# Patient Record
Sex: MALE | Race: WHITE | NOT HISPANIC OR LATINO | Employment: FULL TIME | URBAN - NONMETROPOLITAN AREA
[De-identification: names, ages, dates, MRNs, and addresses within clinical notes are randomized per-mention and may not be internally consistent; named-entity substitution may affect disease eponyms.]

---

## 2019-10-11 ENCOUNTER — APPOINTMENT (OUTPATIENT)
Dept: NUCLEAR MEDICINE | Facility: HOSPITAL | Age: 54
End: 2019-10-11

## 2019-10-11 ENCOUNTER — APPOINTMENT (OUTPATIENT)
Dept: ULTRASOUND IMAGING | Facility: HOSPITAL | Age: 54
End: 2019-10-11

## 2019-10-11 ENCOUNTER — APPOINTMENT (OUTPATIENT)
Dept: CARDIOLOGY | Facility: HOSPITAL | Age: 54
End: 2019-10-11

## 2019-10-11 ENCOUNTER — HOSPITAL ENCOUNTER (INPATIENT)
Facility: HOSPITAL | Age: 54
LOS: 1 days | Discharge: HOME | End: 2019-10-11
Attending: INTERNAL MEDICINE | Admitting: INTERNAL MEDICINE

## 2019-10-11 VITALS
RESPIRATION RATE: 14 BRPM | HEIGHT: 69 IN | SYSTOLIC BLOOD PRESSURE: 134 MMHG | WEIGHT: 185.1 LBS | OXYGEN SATURATION: 99 % | TEMPERATURE: 96.8 F | DIASTOLIC BLOOD PRESSURE: 78 MMHG | HEART RATE: 58 BPM | BODY MASS INDEX: 27.42 KG/M2

## 2019-10-11 DIAGNOSIS — E11.40 TYPE 2 DIABETES MELLITUS WITH DIABETIC NEUROPATHY, WITHOUT LONG-TERM CURRENT USE OF INSULIN (HCC): Primary | ICD-10-CM

## 2019-10-11 PROBLEM — I20.0 UNSTABLE ANGINA (HCC): Status: RESOLVED | Noted: 2019-10-11 | Resolved: 2019-10-11

## 2019-10-11 PROBLEM — I20.0 UNSTABLE ANGINA: Status: ACTIVE | Noted: 2019-10-11

## 2019-10-11 LAB
ALBUMIN SERPL-MCNC: 4.27 G/DL (ref 3.5–5.2)
ALBUMIN/GLOB SERPL: 1.5 G/DL
ALP SERPL-CCNC: 69 U/L (ref 39–117)
ALT SERPL W P-5'-P-CCNC: 9 U/L (ref 1–41)
ANION GAP SERPL CALCULATED.3IONS-SCNC: 14.2 MMOL/L (ref 5–15)
APTT PPP: 30.3 SECONDS (ref 23.8–36.1)
AST SERPL-CCNC: 13 U/L (ref 1–40)
BASOPHILS # BLD AUTO: 0.01 10*3/MM3 (ref 0–0.2)
BASOPHILS NFR BLD AUTO: 0.1 % (ref 0–1.5)
BH CV ECHO MEAS - % IVS THICK: 6.9 %
BH CV ECHO MEAS - % LVPW THICK: 50.3 %
BH CV ECHO MEAS - ACS: 1.5 CM
BH CV ECHO MEAS - AO MAX PG: 8.8 MMHG
BH CV ECHO MEAS - AO MEAN PG: 5.6 MMHG
BH CV ECHO MEAS - AO ROOT AREA (BSA CORRECTED): 1.8
BH CV ECHO MEAS - AO ROOT AREA: 10.3 CM^2
BH CV ECHO MEAS - AO ROOT DIAM: 3.6 CM
BH CV ECHO MEAS - AO V2 MAX: 148 CM/SEC
BH CV ECHO MEAS - AO V2 MEAN: 111.9 CM/SEC
BH CV ECHO MEAS - AO V2 VTI: 34.7 CM
BH CV ECHO MEAS - BSA(HAYCOCK): 2 M^2
BH CV ECHO MEAS - BSA: 2 M^2
BH CV ECHO MEAS - BZI_BMI: 27.3 KILOGRAMS/M^2
BH CV ECHO MEAS - BZI_METRIC_HEIGHT: 175.3 CM
BH CV ECHO MEAS - BZI_METRIC_WEIGHT: 83.9 KG
BH CV ECHO MEAS - EDV(CUBED): 86.2 ML
BH CV ECHO MEAS - EDV(MOD-SP4): 62 ML
BH CV ECHO MEAS - EDV(TEICH): 88.5 ML
BH CV ECHO MEAS - EF(CUBED): 86.1 %
BH CV ECHO MEAS - EF(MOD-SP4): 54.8 %
BH CV ECHO MEAS - EF(TEICH): 79.8 %
BH CV ECHO MEAS - ESV(CUBED): 12 ML
BH CV ECHO MEAS - ESV(MOD-SP4): 28 ML
BH CV ECHO MEAS - ESV(TEICH): 17.9 ML
BH CV ECHO MEAS - FS: 48.2 %
BH CV ECHO MEAS - IVS/LVPW: 0.83
BH CV ECHO MEAS - IVSD: 1.2 CM
BH CV ECHO MEAS - IVSS: 1.3 CM
BH CV ECHO MEAS - LA DIMENSION: 3.1 CM
BH CV ECHO MEAS - LA/AO: 0.86
BH CV ECHO MEAS - LV DIASTOLIC VOL/BSA (35-75): 31 ML/M^2
BH CV ECHO MEAS - LV MASS(C)D: 221.3 GRAMS
BH CV ECHO MEAS - LV MASS(C)DI: 110.8 GRAMS/M^2
BH CV ECHO MEAS - LV MASS(C)S: 147.5 GRAMS
BH CV ECHO MEAS - LV MASS(C)SI: 73.8 GRAMS/M^2
BH CV ECHO MEAS - LV SYSTOLIC VOL/BSA (12-30): 14 ML/M^2
BH CV ECHO MEAS - LVIDD: 4.4 CM
BH CV ECHO MEAS - LVIDS: 2.3 CM
BH CV ECHO MEAS - LVLD AP4: 7.5 CM
BH CV ECHO MEAS - LVLS AP4: 6.9 CM
BH CV ECHO MEAS - LVOT AREA (M): 4.2 CM^2
BH CV ECHO MEAS - LVOT AREA: 4.1 CM^2
BH CV ECHO MEAS - LVOT DIAM: 2.3 CM
BH CV ECHO MEAS - LVPWD: 1.4 CM
BH CV ECHO MEAS - LVPWS: 2.2 CM
BH CV ECHO MEAS - MV A MAX VEL: 58.2 CM/SEC
BH CV ECHO MEAS - MV E MAX VEL: 82.9 CM/SEC
BH CV ECHO MEAS - MV E/A: 1.4
BH CV ECHO MEAS - PA ACC SLOPE: 780.6 CM/SEC^2
BH CV ECHO MEAS - PA ACC TIME: 0.14 SEC
BH CV ECHO MEAS - PA PR(ACCEL): 15.6 MMHG
BH CV ECHO MEAS - SI(AO): 179.3 ML/M^2
BH CV ECHO MEAS - SI(CUBED): 37.2 ML/M^2
BH CV ECHO MEAS - SI(MOD-SP4): 17 ML/M^2
BH CV ECHO MEAS - SI(TEICH): 35.3 ML/M^2
BH CV ECHO MEAS - SV(AO): 358.4 ML
BH CV ECHO MEAS - SV(CUBED): 74.2 ML
BH CV ECHO MEAS - SV(MOD-SP4): 34 ML
BH CV ECHO MEAS - SV(TEICH): 70.6 ML
BH CV NUCLEAR PRIOR STUDY: 3
BH CV STRESS BP STAGE 1: NORMAL
BH CV STRESS BP STAGE 2: NORMAL
BH CV STRESS COMMENTS STAGE 1: NORMAL
BH CV STRESS COMMENTS STAGE 2: NORMAL
BH CV STRESS DOSE REGADENOSON STAGE 1: 0.4
BH CV STRESS DURATION MIN STAGE 1: 0
BH CV STRESS DURATION MIN STAGE 2: 4
BH CV STRESS DURATION SEC STAGE 1: 10
BH CV STRESS DURATION SEC STAGE 2: 0
BH CV STRESS HR STAGE 1: 97
BH CV STRESS HR STAGE 2: 84
BH CV STRESS PROTOCOL 1: NORMAL
BH CV STRESS RECOVERY BP: NORMAL MMHG
BH CV STRESS RECOVERY HR: 80 BPM
BH CV STRESS STAGE 1: 1
BH CV STRESS STAGE 2: 2
BILIRUB SERPL-MCNC: 0.4 MG/DL (ref 0.2–1.2)
BUN BLD-MCNC: 15 MG/DL (ref 6–20)
BUN/CREAT SERPL: 18.1 (ref 7–25)
CALCIUM SPEC-SCNC: 9.4 MG/DL (ref 8.6–10.5)
CHLORIDE SERPL-SCNC: 100 MMOL/L (ref 98–107)
CHOLEST SERPL-MCNC: 192 MG/DL (ref 0–200)
CO2 SERPL-SCNC: 22.8 MMOL/L (ref 22–29)
CREAT BLD-MCNC: 0.83 MG/DL (ref 0.76–1.27)
D-LACTATE SERPL-SCNC: 1.5 MMOL/L (ref 0.5–2)
D-LACTATE SERPL-SCNC: 2.2 MMOL/L (ref 0.5–2)
DEPRECATED RDW RBC AUTO: 41 FL (ref 37–54)
EOSINOPHIL # BLD AUTO: 0 10*3/MM3 (ref 0–0.4)
EOSINOPHIL NFR BLD AUTO: 0 % (ref 0.3–6.2)
ERYTHROCYTE [DISTWIDTH] IN BLOOD BY AUTOMATED COUNT: 12.5 % (ref 12.3–15.4)
GFR SERPL CREATININE-BSD FRML MDRD: 97 ML/MIN/1.73
GLOBULIN UR ELPH-MCNC: 2.8 GM/DL
GLUCOSE BLD-MCNC: 322 MG/DL (ref 65–99)
GLUCOSE BLDC GLUCOMTR-MCNC: 303 MG/DL (ref 70–130)
GLUCOSE BLDC GLUCOMTR-MCNC: 321 MG/DL (ref 70–130)
GLUCOSE BLDC GLUCOMTR-MCNC: 332 MG/DL (ref 70–130)
HBA1C MFR BLD: 8.7 % (ref 4.8–5.6)
HCT VFR BLD AUTO: 40.4 % (ref 37.5–51)
HDLC SERPL-MCNC: 45 MG/DL (ref 40–60)
HGB BLD-MCNC: 13.2 G/DL (ref 13–17.7)
HOLD SPECIMEN: NORMAL
IMM GRANULOCYTES # BLD AUTO: 0.04 10*3/MM3 (ref 0–0.05)
IMM GRANULOCYTES NFR BLD AUTO: 0.3 % (ref 0–0.5)
INR PPP: 1.11 (ref 0.9–1.1)
LDLC SERPL CALC-MCNC: 122 MG/DL (ref 0–100)
LDLC/HDLC SERPL: 2.72 {RATIO}
LV EF NUC BP: 63 %
LYMPHOCYTES # BLD AUTO: 1.62 10*3/MM3 (ref 0.7–3.1)
LYMPHOCYTES NFR BLD AUTO: 10.3 % (ref 19.6–45.3)
MAGNESIUM SERPL-MCNC: 1.8 MG/DL (ref 1.6–2.6)
MAXIMAL PREDICTED HEART RATE: 168 BPM
MAXIMAL PREDICTED HEART RATE: 168 BPM
MCH RBC QN AUTO: 29.9 PG (ref 26.6–33)
MCHC RBC AUTO-ENTMCNC: 32.7 G/DL (ref 31.5–35.7)
MCV RBC AUTO: 91.4 FL (ref 79–97)
MONOCYTES # BLD AUTO: 0.33 10*3/MM3 (ref 0.1–0.9)
MONOCYTES NFR BLD AUTO: 2.1 % (ref 5–12)
NEUTROPHILS # BLD AUTO: 13.76 10*3/MM3 (ref 1.7–7)
NEUTROPHILS NFR BLD AUTO: 87.2 % (ref 42.7–76)
PERCENT MAX PREDICTED HR: 57.74 %
PHOSPHATE SERPL-MCNC: 4 MG/DL (ref 2.5–4.5)
PLATELET # BLD AUTO: 201 10*3/MM3 (ref 140–450)
PMV BLD AUTO: 11.2 FL (ref 6–12)
POTASSIUM BLD-SCNC: 4.4 MMOL/L (ref 3.5–5.2)
PROT SERPL-MCNC: 7.1 G/DL (ref 6–8.5)
PROTHROMBIN TIME: 14.8 SECONDS (ref 11–15.4)
RBC # BLD AUTO: 4.42 10*6/MM3 (ref 4.14–5.8)
SODIUM BLD-SCNC: 137 MMOL/L (ref 136–145)
STRESS BASELINE BP: NORMAL MMHG
STRESS BASELINE HR: 66 BPM
STRESS PERCENT HR: 68 %
STRESS POST PEAK BP: NORMAL MMHG
STRESS POST PEAK HR: 97 BPM
STRESS TARGET HR: 143 BPM
STRESS TARGET HR: 143 BPM
TRIGL SERPL-MCNC: 123 MG/DL (ref 0–150)
TROPONIN T SERPL-MCNC: <0.01 NG/ML (ref 0–0.03)
TROPONIN T SERPL-MCNC: <0.01 NG/ML (ref 0–0.03)
TSH SERPL DL<=0.05 MIU/L-ACNC: 0.28 UIU/ML (ref 0.27–4.2)
VLDLC SERPL-MCNC: 24.6 MG/DL
WBC NRBC COR # BLD: 15.76 10*3/MM3 (ref 3.4–10.8)

## 2019-10-11 PROCEDURE — 99222 1ST HOSP IP/OBS MODERATE 55: CPT | Performed by: INTERNAL MEDICINE

## 2019-10-11 PROCEDURE — 85025 COMPLETE CBC W/AUTO DIFF WBC: CPT | Performed by: INTERNAL MEDICINE

## 2019-10-11 PROCEDURE — 84100 ASSAY OF PHOSPHORUS: CPT | Performed by: INTERNAL MEDICINE

## 2019-10-11 PROCEDURE — 82962 GLUCOSE BLOOD TEST: CPT

## 2019-10-11 PROCEDURE — 78452 HT MUSCLE IMAGE SPECT MULT: CPT | Performed by: INTERNAL MEDICINE

## 2019-10-11 PROCEDURE — 63710000001 INSULIN ASPART PER 5 UNITS: Performed by: INTERNAL MEDICINE

## 2019-10-11 PROCEDURE — 85610 PROTHROMBIN TIME: CPT | Performed by: INTERNAL MEDICINE

## 2019-10-11 PROCEDURE — 93306 TTE W/DOPPLER COMPLETE: CPT

## 2019-10-11 PROCEDURE — 83605 ASSAY OF LACTIC ACID: CPT | Performed by: INTERNAL MEDICINE

## 2019-10-11 PROCEDURE — 84484 ASSAY OF TROPONIN QUANT: CPT | Performed by: INTERNAL MEDICINE

## 2019-10-11 PROCEDURE — 93005 ELECTROCARDIOGRAM TRACING: CPT | Performed by: INTERNAL MEDICINE

## 2019-10-11 PROCEDURE — 25010000002 INFLUENZA VAC SUBUNIT QUAD 0.5 ML SUSPENSION PREFILLED SYRINGE: Performed by: INTERNAL MEDICINE

## 2019-10-11 PROCEDURE — A9500 TC99M SESTAMIBI: HCPCS | Performed by: INTERNAL MEDICINE

## 2019-10-11 PROCEDURE — 25010000002 HEPARIN (PORCINE) PER 1000 UNITS: Performed by: INTERNAL MEDICINE

## 2019-10-11 PROCEDURE — 80053 COMPREHEN METABOLIC PANEL: CPT | Performed by: INTERNAL MEDICINE

## 2019-10-11 PROCEDURE — 83036 HEMOGLOBIN GLYCOSYLATED A1C: CPT | Performed by: INTERNAL MEDICINE

## 2019-10-11 PROCEDURE — 0 TECHNETIUM SESTAMIBI: Performed by: INTERNAL MEDICINE

## 2019-10-11 PROCEDURE — G0108 DIAB MANAGE TRN  PER INDIV: HCPCS

## 2019-10-11 PROCEDURE — G0008 ADMIN INFLUENZA VIRUS VAC: HCPCS | Performed by: INTERNAL MEDICINE

## 2019-10-11 PROCEDURE — 78452 HT MUSCLE IMAGE SPECT MULT: CPT

## 2019-10-11 PROCEDURE — 93306 TTE W/DOPPLER COMPLETE: CPT | Performed by: INTERNAL MEDICINE

## 2019-10-11 PROCEDURE — 25010000002 REGADENOSON 0.4 MG/5ML SOLUTION: Performed by: INTERNAL MEDICINE

## 2019-10-11 PROCEDURE — 83735 ASSAY OF MAGNESIUM: CPT | Performed by: INTERNAL MEDICINE

## 2019-10-11 PROCEDURE — 84443 ASSAY THYROID STIM HORMONE: CPT | Performed by: INTERNAL MEDICINE

## 2019-10-11 PROCEDURE — 93017 CV STRESS TEST TRACING ONLY: CPT

## 2019-10-11 PROCEDURE — 76775 US EXAM ABDO BACK WALL LIM: CPT

## 2019-10-11 PROCEDURE — 76775 US EXAM ABDO BACK WALL LIM: CPT | Performed by: RADIOLOGY

## 2019-10-11 PROCEDURE — 93018 CV STRESS TEST I&R ONLY: CPT | Performed by: INTERNAL MEDICINE

## 2019-10-11 PROCEDURE — 94799 UNLISTED PULMONARY SVC/PX: CPT

## 2019-10-11 PROCEDURE — 99236 HOSP IP/OBS SAME DATE HI 85: CPT | Performed by: INTERNAL MEDICINE

## 2019-10-11 PROCEDURE — 90674 CCIIV4 VAC NO PRSV 0.5 ML IM: CPT | Performed by: INTERNAL MEDICINE

## 2019-10-11 PROCEDURE — 85730 THROMBOPLASTIN TIME PARTIAL: CPT | Performed by: INTERNAL MEDICINE

## 2019-10-11 PROCEDURE — 93010 ELECTROCARDIOGRAM REPORT: CPT | Performed by: INTERNAL MEDICINE

## 2019-10-11 PROCEDURE — 80061 LIPID PANEL: CPT | Performed by: INTERNAL MEDICINE

## 2019-10-11 RX ORDER — CEFDINIR 300 MG/1
300 CAPSULE ORAL EVERY 12 HOURS SCHEDULED
Status: DISCONTINUED | OUTPATIENT
Start: 2019-10-11 | End: 2019-10-11 | Stop reason: HOSPADM

## 2019-10-11 RX ORDER — ATORVASTATIN CALCIUM 40 MG/1
40 TABLET, FILM COATED ORAL NIGHTLY
Status: DISCONTINUED | OUTPATIENT
Start: 2019-10-11 | End: 2019-10-11 | Stop reason: HOSPADM

## 2019-10-11 RX ORDER — HEPARIN SODIUM 5000 [USP'U]/ML
60 INJECTION, SOLUTION INTRAVENOUS; SUBCUTANEOUS AS NEEDED
Status: DISCONTINUED | OUTPATIENT
Start: 2019-10-11 | End: 2019-10-11

## 2019-10-11 RX ORDER — ISOSORBIDE MONONITRATE 30 MG/1
30 TABLET, EXTENDED RELEASE ORAL
Status: DISCONTINUED | OUTPATIENT
Start: 2019-10-11 | End: 2019-10-11 | Stop reason: HOSPADM

## 2019-10-11 RX ORDER — GABAPENTIN 800 MG/1
800 TABLET ORAL 3 TIMES DAILY
COMMUNITY

## 2019-10-11 RX ORDER — BENAZEPRIL HYDROCHLORIDE 20 MG/1
20 TABLET ORAL DAILY
Status: ON HOLD | COMMUNITY
End: 2019-10-11 | Stop reason: SDUPTHER

## 2019-10-11 RX ORDER — ISOSORBIDE MONONITRATE 30 MG/1
30 TABLET, EXTENDED RELEASE ORAL
Qty: 30 TABLET | Refills: 0 | Status: SHIPPED | OUTPATIENT
Start: 2019-10-12 | End: 2021-04-07 | Stop reason: ALTCHOICE

## 2019-10-11 RX ORDER — FENOFIBRATE 145 MG/1
145 TABLET, COATED ORAL DAILY
COMMUNITY

## 2019-10-11 RX ORDER — DOXYCYCLINE 100 MG/1
100 CAPSULE ORAL EVERY 12 HOURS SCHEDULED
Qty: 13 CAPSULE | Refills: 0 | Status: SHIPPED | OUTPATIENT
Start: 2019-10-11 | End: 2019-10-18

## 2019-10-11 RX ORDER — CEFDINIR 300 MG/1
300 CAPSULE ORAL EVERY 12 HOURS SCHEDULED
Qty: 13 CAPSULE | Refills: 0 | Status: SHIPPED | OUTPATIENT
Start: 2019-10-11 | End: 2019-10-18

## 2019-10-11 RX ORDER — MAGNESIUM SULFATE HEPTAHYDRATE 40 MG/ML
4 INJECTION, SOLUTION INTRAVENOUS AS NEEDED
Status: DISCONTINUED | OUTPATIENT
Start: 2019-10-11 | End: 2019-10-11 | Stop reason: HOSPADM

## 2019-10-11 RX ORDER — ACETAMINOPHEN AND CODEINE PHOSPHATE 300; 30 MG/1; MG/1
1 TABLET ORAL 2 TIMES DAILY PRN
COMMUNITY
End: 2021-04-07 | Stop reason: ALTCHOICE

## 2019-10-11 RX ORDER — DEXTROSE MONOHYDRATE 25 G/50ML
25 INJECTION, SOLUTION INTRAVENOUS
Status: DISCONTINUED | OUTPATIENT
Start: 2019-10-11 | End: 2019-10-11 | Stop reason: HOSPADM

## 2019-10-11 RX ORDER — SODIUM CHLORIDE 0.9 % (FLUSH) 0.9 %
10 SYRINGE (ML) INJECTION AS NEEDED
Status: DISCONTINUED | OUTPATIENT
Start: 2019-10-11 | End: 2019-10-11 | Stop reason: HOSPADM

## 2019-10-11 RX ORDER — LISINOPRIL 10 MG/1
20 TABLET ORAL DAILY
Status: CANCELLED | OUTPATIENT
Start: 2019-10-11

## 2019-10-11 RX ORDER — ASPIRIN 81 MG/1
81 TABLET ORAL DAILY
Qty: 30 TABLET | Refills: 0 | Status: SHIPPED | OUTPATIENT
Start: 2019-10-12 | End: 2021-04-07 | Stop reason: ALTCHOICE

## 2019-10-11 RX ORDER — NICOTINE POLACRILEX 4 MG
15 LOZENGE BUCCAL
Status: DISCONTINUED | OUTPATIENT
Start: 2019-10-11 | End: 2019-10-11 | Stop reason: HOSPADM

## 2019-10-11 RX ORDER — SODIUM CHLORIDE 0.9 % (FLUSH) 0.9 %
10 SYRINGE (ML) INJECTION EVERY 12 HOURS SCHEDULED
Status: DISCONTINUED | OUTPATIENT
Start: 2019-10-11 | End: 2019-10-11 | Stop reason: HOSPADM

## 2019-10-11 RX ORDER — UREA 10 %
1 LOTION (ML) TOPICAL DAILY
Qty: 7 TABLET | Refills: 0 | Status: SHIPPED | OUTPATIENT
Start: 2019-10-11 | End: 2019-10-18

## 2019-10-11 RX ORDER — GABAPENTIN 400 MG/1
800 CAPSULE ORAL 3 TIMES DAILY
Status: DISCONTINUED | OUTPATIENT
Start: 2019-10-11 | End: 2019-10-11 | Stop reason: HOSPADM

## 2019-10-11 RX ORDER — MAGNESIUM SULFATE HEPTAHYDRATE 40 MG/ML
2 INJECTION, SOLUTION INTRAVENOUS AS NEEDED
Status: DISCONTINUED | OUTPATIENT
Start: 2019-10-11 | End: 2019-10-11 | Stop reason: HOSPADM

## 2019-10-11 RX ORDER — BENAZEPRIL HYDROCHLORIDE 20 MG/1
20 TABLET ORAL DAILY
Start: 2019-10-11

## 2019-10-11 RX ORDER — ASPIRIN 81 MG/1
81 TABLET ORAL DAILY
Status: DISCONTINUED | OUTPATIENT
Start: 2019-10-11 | End: 2019-10-11 | Stop reason: HOSPADM

## 2019-10-11 RX ORDER — CLOPIDOGREL BISULFATE 75 MG/1
75 TABLET ORAL DAILY
COMMUNITY

## 2019-10-11 RX ORDER — HEPARIN SODIUM 5000 [USP'U]/ML
30 INJECTION, SOLUTION INTRAVENOUS; SUBCUTANEOUS AS NEEDED
Status: DISCONTINUED | OUTPATIENT
Start: 2019-10-11 | End: 2019-10-11

## 2019-10-11 RX ORDER — HEPARIN SODIUM 10000 [USP'U]/100ML
11.9 INJECTION, SOLUTION INTRAVENOUS
Status: DISCONTINUED | OUTPATIENT
Start: 2019-10-11 | End: 2019-10-11

## 2019-10-11 RX ORDER — ATORVASTATIN CALCIUM 40 MG/1
40 TABLET, FILM COATED ORAL NIGHTLY
Qty: 30 TABLET | Refills: 0 | Status: SHIPPED | OUTPATIENT
Start: 2019-10-11 | End: 2021-04-07 | Stop reason: ALTCHOICE

## 2019-10-11 RX ORDER — CLOPIDOGREL BISULFATE 75 MG/1
75 TABLET ORAL DAILY
Status: DISCONTINUED | OUTPATIENT
Start: 2019-10-11 | End: 2019-10-11 | Stop reason: HOSPADM

## 2019-10-11 RX ORDER — NICOTINE 21 MG/24HR
1 PATCH, TRANSDERMAL 24 HOURS TRANSDERMAL
Status: DISCONTINUED | OUTPATIENT
Start: 2019-10-11 | End: 2019-10-11 | Stop reason: HOSPADM

## 2019-10-11 RX ORDER — NITROGLYCERIN 0.4 MG/1
0.4 TABLET SUBLINGUAL
Status: DISCONTINUED | OUTPATIENT
Start: 2019-10-11 | End: 2019-10-11 | Stop reason: HOSPADM

## 2019-10-11 RX ORDER — DOXYCYCLINE 100 MG/1
100 CAPSULE ORAL EVERY 12 HOURS SCHEDULED
Status: DISCONTINUED | OUTPATIENT
Start: 2019-10-11 | End: 2019-10-11 | Stop reason: HOSPADM

## 2019-10-11 RX ORDER — ACETAMINOPHEN AND CODEINE PHOSPHATE 300; 30 MG/1; MG/1
1 TABLET ORAL 2 TIMES DAILY PRN
Status: DISCONTINUED | OUTPATIENT
Start: 2019-10-11 | End: 2019-10-11 | Stop reason: HOSPADM

## 2019-10-11 RX ADMIN — GABAPENTIN 800 MG: 400 CAPSULE ORAL at 15:06

## 2019-10-11 RX ADMIN — GABAPENTIN 800 MG: 400 CAPSULE ORAL at 09:07

## 2019-10-11 RX ADMIN — CEFDINIR 300 MG: 300 CAPSULE ORAL at 09:09

## 2019-10-11 RX ADMIN — HEPARIN SODIUM 11.9 UNITS/KG/HR: 10000 INJECTION, SOLUTION INTRAVENOUS at 08:01

## 2019-10-11 RX ADMIN — METOPROLOL TARTRATE 25 MG: 25 TABLET, FILM COATED ORAL at 17:08

## 2019-10-11 RX ADMIN — CLOPIDOGREL 75 MG: 75 TABLET, FILM COATED ORAL at 09:21

## 2019-10-11 RX ADMIN — ASPIRIN 81 MG: 81 TABLET, COATED ORAL at 09:07

## 2019-10-11 RX ADMIN — REGADENOSON 0.4 MG: 0.08 INJECTION, SOLUTION INTRAVENOUS at 12:46

## 2019-10-11 RX ADMIN — DOXYCYCLINE 100 MG: 100 CAPSULE ORAL at 09:09

## 2019-10-11 RX ADMIN — INFLUENZA A VIRUS A/SINGAPORE/GP1908/2015 IVR-180 (H1N1) ANTIGEN (MDCK CELL DERIVED, PROPIOLACTONE INACTIVATED), INFLUENZA A VIRUS A/NORTH CAROLINA/04/2016 (H3N2) HEMAGGLUTININ ANTIGEN (MDCK CELL DERIVED, PROPIOLACTONE INACTIVATED), INFLUENZA B VIRUS B/IOWA/06/2017 HEMAGGLUTININ ANTIGEN (MDCK CELL DERIVED, PROPIOLACTONE INACTIVATED), INFLUENZA B VIRUS B/SINGAPORE/INFTT-16-0610/2016 HEMAGGLUTININ ANTIGEN (MDCK CELL DERIVED, PROPIOLACTONE INACTIVATED) 0.5 ML: 15; 15; 15; 15 INJECTION, SUSPENSION INTRAMUSCULAR at 12:02

## 2019-10-11 RX ADMIN — ISOSORBIDE MONONITRATE 30 MG: 30 TABLET, EXTENDED RELEASE ORAL at 17:07

## 2019-10-11 RX ADMIN — TECHNETIUM TC 99M SESTAMIBI 1 DOSE: 1 INJECTION INTRAVENOUS at 10:35

## 2019-10-11 RX ADMIN — TECHNETIUM TC 99M SESTAMIBI 1 DOSE: 1 INJECTION INTRAVENOUS at 12:46

## 2019-10-11 RX ADMIN — SODIUM CHLORIDE, PRESERVATIVE FREE 10 ML: 5 INJECTION INTRAVENOUS at 09:10

## 2019-10-11 RX ADMIN — INSULIN ASPART 5 UNITS: 100 INJECTION, SOLUTION INTRAVENOUS; SUBCUTANEOUS at 09:06

## 2019-10-11 RX ADMIN — NICOTINE 1 PATCH: 21 PATCH TRANSDERMAL at 09:09

## 2019-10-11 NOTE — H&P
[facility] HOSPITALIST HISTORY AND PHYSICAL    Patient Identification:  Name:  Kary Segundo  Age:  53 y.o.  Sex:  male  :  1965  MRN:  [id number]   Visit Number:  [id number]  Room number:  P222/1P  Primary Care Physician:  Bridget Perdomo APRN    Date of Admission: 10/11/2019     Subjective     Chief complaint:   Chest pain; transferred from [facility]    History of presenting illness:  53 y.o. male who presented from [facility] with chest pain.  The patient was actually seen at the emergency department twice on 10/10/2019.  The first time was for an allergic reaction (hives all over his body) after he ate something.  He was given medication and sent home.  He then came back hours later with chest pain.  The patient states that he was at work at 1 PM on 10/10/2019 and had been there for 30 minutes lifting boxes; he works at [employer] and [employer].  He states that he had a sudden onset of sharp substernal pain that did not radiate.  He took a total of about 25 nitroglycerin sublingual before he came to [facility].  He states that the nitroglycerin did help but did not take the pain completely away.  He received the medications listed below while at [facility].    He received 324 mg of aspirin on 10/10/2019 at 8 PM, Solu-Medrol 125 mg IM x1 at 8:25PM, heparin drip with a 5000 unit bolus given at midnight on 10/11/2019 with the drip starting at 848.2 units/h at 12:04.    The emergency room doctor did call us for transfer as the patient had a positive troponin based on the day or labs.  Please note that [facility] did not send us the actual troponin measurement.  Per verbal report from the emergency room doctor, he stated that it was 0.188 with their cutoff being 0.056.    The patient told me that he is currently chest pain-free.  The chest pain that he had earlier today was not associated with nausea or sweating.  He states he had a heart  attack back in 2016.  He states that time he had a one-time sharp substernal pain that did not last very long.  He went to see the doctor and an EKG was performed.  Based on the EKG, the doctor told him that he had had a heart attack.  The patient has not had a stress test or left heart catheterization.    The patient denied cough, runny nose, nausea, vomiting, diarrhea, or any other acute illness symptoms.  However, during my evaluation of the patient, he started coughing.  I did note some wheezing on exam.    The patient does have risk factors for coronary artery disease and thus he was accepted in transfer to our progressive care unit.    Please note that the bedside glucose measurement on admission was 323; no insulin was given at the outside hospital.  ---------------------------------------------------------------------------------------------------------------------   Review of Systems   Constitutional: Negative for chills, fatigue and fever.   HENT: Negative for postnasal drip, rhinorrhea, sneezing and sore throat.    Eyes: Negative for discharge and redness.   Respiratory: Negative for cough, shortness of breath and wheezing.    Cardiovascular: Positive for chest pain. Negative for palpitations and leg swelling.   Gastrointestinal: Negative for diarrhea, nausea and vomiting.   Genitourinary: Negative for decreased urine volume, dysuria and hematuria.   Musculoskeletal: Negative for arthralgias and joint swelling.   Skin: Negative for pallor, rash and wound.   Neurological: Negative for seizures, speech difficulty and headaches.   Hematological: Does not bruise/bleed easily.   Psychiatric/Behavioral: Negative for confusion, self-injury and suicidal ideas. The patient is not nervous/anxious.      ---------------------------------------------------------------------------------------------------------------------   Past Medical History:   Diagnosis Date   • AMI (acute myocardial infarction) (CMS/HCC)    •  Anemia    • Elevated cholesterol    • Hyperlipidemia    • Hypertension    • Type 2 diabetes mellitus (CMS/[facility])      Past Surgical History:   Procedure Laterality Date   • CIRCUMCISION     • COLONOSCOPY     • SHOULDER SURGERY     • UMBILICAL HERNIA REPAIR       Family History   Problem Relation Age of Onset   • Diabetes Mother    • Diabetes Father    • Hypertension Father    • Diabetes Sister    • Diabetes Brother      Social History     Socioeconomic History   • Marital status:      Spouse name: Not on file   • Number of children: Not on file   • Years of education: Not on file   • Highest education level: Not on file   Tobacco Use   • Smoking status: Current Every Day Smoker     Packs/day: 1.00     Years: 15.00     Pack years: 15.00     Types: Cigarettes     Start date: 10/11/2006   Substance and Sexual Activity   • Alcohol use: No     Frequency: Never   • Drug use: No   • Sexual activity: Defer     ---------------------------------------------------------------------------------------------------------------------   Allergies:  Patient has no known allergies.  ---------------------------------------------------------------------------------------------------------------------   Medications below are reported home medications pulling from within the system; at this time, these medications have not been reconciled unless otherwise specified and are in the verification process for further verifcation as current home medications.    Prior to Admission Medications     Prescriptions Last Dose Informant Patient Reported? Taking?    acetaminophen-codeine (TYLENOL #3) 300-30 MG per tablet 10/9/2019 Self Yes Yes    Take 1 tablet by mouth 2 (Two) Times a Day As Needed for Moderate Pain .    benazepril (LOTENSIN) 20 MG tablet 10/10/2019 Self Yes Yes    Take 20 mg by mouth Daily.    clopidogrel (PLAVIX) 75 MG tablet 10/10/2019 Self Yes Yes    Take 75 mg by mouth Daily.    fenofibrate (TRICOR) 145 MG tablet 10/10/2019  Self Yes Yes    Take 145 mg by mouth Daily.    gabapentin (NEURONTIN) 800 MG tablet 10/10/2019 Self Yes Yes    Take 800 mg by mouth 3 (Three) Times a Day.    metFORMIN (GLUCOPHAGE) 1000 MG tablet 10/10/2019 Self Yes Yes    Take 1,000 mg by mouth 2 (Two) Times a Day With Meals.        Objective     Vital Signs:  Temp:  [97.7 °F (36.5 °C)] 97.7 °F (36.5 °C)  Heart Rate:  [60-84] 80  Resp:  [14-20] 16  BP: (113-140)/(50-79) 133/71    Mean Arterial Pressure (Non-Invasive) for the past 24 hrs (Last 3 readings):   Noninvasive MAP (mmHg)   10/11/19 0520 83   10/11/19 0505 81   10/11/19 0450 85     SpO2:  [94 %-98 %] 94 %  Device (Oxygen Therapy): room air  Body mass index is 27.33 kg/m².    Wt Readings from Last 3 Encounters:   10/11/19 84 kg (185 lb 1.6 oz)      ---------------------------------------------------------------------------------------------------------------------   Physical Exam:  Constitutional:  Well-developed and well-nourished.  No respiratory distress.      HENT:  Head: Normocephalic and atraumatic.  Mouth:  Moist mucous membranes.    Eyes:  Conjunctivae and EOM are normal.  Pupils are equal, round, and reactive to light.  No scleral icterus.  Cardiovascular:  Normal rate, regular rhythm and normal heart sounds with no murmur.  Pulmonary/Chest:  No respiratory distress, no wheezes, no crackles, with normal breath sounds and good air movement.  Abdominal:  Soft.  Bowel sounds are normal.  No distension and no tenderness.   Musculoskeletal:  No edema, no tenderness, and no deformity.  No red or swollen joints anywhere.    Neurological:  Alert and oriented to person, place, and time.  No cranial nerve deficit.  No tongue deviation.  No facial droop.  No slurred speech.   Skin:  Skin is warm and dry.  No rash noted.  No pallor.  He had multiple old tattoos on his back and arms.  Peripheral vascular:  No edema and strong pulses on all 4 extremities.  Genitourinary:  No swanson catheter in  place.  ---------------------------------------------------------------------------------------------------------------------  EKG: I have reviewed 2 EKGs from [facility] and one EKG from our facility.  The first EKG from [facility] is timed at 5:58 AM.  It shows sinus rhythm with a heart rate of 74 and a QTC of 407 ms.  It appears to be a junctional rhythm with the inferior leads demonstrating inverted P waves.  There is no ST elevation or ST depression noted.  The second EKG from [facility] is timed 1940 2 PM.  Again, it shows normal sinus rhythm with a heart rate of 85 and a QTC of 378 ms.  The P waves are now with normal orientation.  Again noted is no ST elevation.  However, in the anterior lateral leads there does appear to be about a millimeter of ST depression.  EKG from [facility] shows a junctional rhythm with inverted T waves in the inferior leads.  Heart rate is 62.  QTC of 408 ms.  I do not see any obvious Q waves.  There is no anterior lateral ST depression anymore.  There is no ST elevation.    Telemetry:  NS 60-80's.    I have personally looked at all the EKGs and the telemetry strips.  --------------------------------------------------------------------------------------------------------------------  LABS:    Outside labs from [facility]:  White blood cell count 13,360, hemoglobin 12.2, hematocrit 35.6, platelets 205,000  Sodium 135, potassium 5.2, chloride 101, bicarb 22, glucose 458, BUN 15, creatinine 1.52, calcium 8.5, total bilirubin 0.3, albumin 3.6, alkaline phosphatase 64, ALT 12, AST 9, total protein 6.6, anion gap 17      CBC and coagulation:  Results from last 7 days   Lab Units 10/11/19  0430   LACTATE mmol/L 2.2*   WBC 10*3/mm3 15.76*   HEMOGLOBIN g/dL 13.2   HEMATOCRIT % 40.4   MCV fL 91.4   MCHC g/dL 32.7   PLATELETS 10*3/mm3 201       Renal and electrolytes:  Results from last 7 days   Lab Units 10/11/19  0430   SODIUM  mmol/L 137   POTASSIUM mmol/L 4.4   MAGNESIUM mg/dL 1.8   CHLORIDE mmol/L 100   CO2 mmol/L 22.8   BUN mg/dL 15   CREATININE mg/dL 0.83   EGFR IF NONAFRICN AM mL/min/1.73 97   CALCIUM mg/dL 9.4   PHOSPHORUS mg/dL 4.0   GLUCOSE mg/dL 322*     Estimated Creatinine Clearance: 123.7 mL/min (by C-G formula based on SCr of 0.83 mg/dL).    Liver and pancreatic function:  Results from last 7 days   Lab Units 10/11/19  0430   ALBUMIN g/dL 4.27   BILIRUBIN mg/dL 0.4   ALK PHOS U/L 69   AST (SGOT) U/L 13   ALT (SGPT) U/L 9     Endocrine function:  Lab Results   Component Value Date    HGBA1C 8.70 (H) 10/11/2019     Point of care bedside glucose levels:  Results from last 7 days   Lab Units 10/11/19  0422   GLUCOSE mg/dL 332*     Lab Results   Component Value Date    TSH 0.282 10/11/2019     Cardiac:  Results from last 7 days   Lab Units 10/11/19  0430   TROPONIN T ng/mL <0.010       Cultures:  Brief Urine Lab Results     None        I have personally looked at the labs and they are summarized above.  ----------------------------------------------------------------------------------------------------------------------  Detailed radiology reports for the last 24 hours:    Imaging Results (last 24 hours)     The patient had chest x-ray performed at [facility].  [facility] did send a CD with the chest x-ray on it but I am unable to view the CXR.  Per verbal report from the ED doctor, no infiltrates were seen.  A final radiology report was not sent with the patient upon transfer.        Assessment & Plan       -Typical chest pain in a patient that is concerning for unstable angina  -Acute COPD exacerbation  -Suspect chronic kidney disease stage III with baseline Cr at the outside hospital being 1.2  -Acute hypomagnesemia  -Lactic acidosis of unknown etiology  -Type 2 diabetes mellitus, non insulin dependent, with hyperglycemia currently  -Essential hypertension  -Tobacco smoking addiction    Admitted directly from [facility]  ED to our PCU with concerns for unstable angina.  Will perform serial cardiac enzymes, give aspirin daily at 81 mg, give Lipitor 40 mg daily, and continue the heparin drip.  Will consult cardiology for an opinion about the need for stress testing.  Will give oral Omnicef and doxycycline and scheduled Atrovent for the mild COPD exacerbation.  Not giving steroids for the acute COPD exacerbation due to the hyperglycemia.  Will perform 4 times a day bedside glucose monitoring and give as needed Novolog for the hyperglycemia.  Will obtain a CXR.  Will monitor the blood pressures closely.  His heart rates at first were in the 60's and with the acute COPD exacerbation I do not want to add a beta blocker yet.  Unsure if he has chronic kidney disease but I suspect that he does.  Will obtain a UA, urine protein/Cr ratio, and renal ultrasound.  If he needs antihypertensives, then would start an ACE inhibitor or ARB (assuming that the ECHO does not show cardiomyopathy) but with his potential need for cardiac interventions I will hold off on this for now and just trend the BPs.  Will replace the magnesium per the replacement protocol and continue to monitor the labs closely.  Will offer a nicotine patch.  Will trend the lactic acid level as well.    VTE Prophylaxis:   Mechanical Order History:     None      Pharmalogical Order History:     None          Rosas Tierney MD  [facility]  10/11/19  6:34 AM

## 2019-10-11 NOTE — DISCHARGE SUMMARY
Discharge Summary:    Date of Admission: 10/11/2019  Date of Discharge:  10/11/2019    PCP: Bridget Perdomo APRN    DISCHARGE DIAGNOSIS  -Chest pain, ruled out acute myocardial infarction   -Uncontrolled diabetes mellitus type 2  -Essential hypertension  -Tobacco abuse  -Acute COPD exacerbation  -Lactic acidosis that may have been medication induced    SECONDARY DIAGNOSES  Past Medical History:   Diagnosis Date   • AMI (acute myocardial infarction) (CMS/[facility])    • Anemia    • Elevated cholesterol    • Hyperlipidemia    • Hypertension    • Type 2 diabetes mellitus (CMS/[facility])        CONSULTS   Dr. Rust-Interventional Cardiology    PROCEDURES PERFORMED  Echocardiogram:  · Left ventricular systolic function is normal. Estimated EF appears to be in the range of 61 - 65%.  · No significant valvular abnormalities notedLeft ventricular diastolic function is normal.  · Normal right ventricular cavity size and systolic function noted  · There is no evidence of pericardial effusion.    Stress Test:  · Myocardial perfusion imaging indicates a medium-sized infarct located in the basal and mid inferior wall with mild bong-infarct ischemia.  · Diaphragmatic attenuation artifact is present.  · Left ventricular ejection fraction is normal (Calculated EF = 63%).  · Findings consistent with a normal ECG stress test.  · Impressions are consistent with an intermediate risk study.    HOSPITAL COURSE  Patient is a 53 y.o. male presented to [facility] complaining of chest pain.  Please see the admitting history and physical for further details.      Patient was accepted in transfer from [facility] with complaints of chest pain.  He was admitted to our progressive care unit.  Patient ruled out for acute myocardial infarction with negative cardiac enzymes.  He was started on aspirin and Lipitor in addition to a heparin infusion.  Cardiology was consulted and the patient underwent an echocardiogram in addition to  "stress test with the above-mentioned results.    It was felt the patient also had an acute COPD exacerbation and he was started on oral antibiotics for this in addition to scheduled nebulized inhalants.  Lactic acid was minimally elevated upon admission at 2.2 and improved to 1.5.    Cardiology evaluated the patient prior to and after his echocardiogram and stress test.  Cardiology recommended that patient be discharged home on an appropriate cardiac regimen.  Patient will follow-up with cardiology in approximately 2 to 4 weeks and if he is continuing to have chest pain then a left heart catheterization will be considered.    Patient was chest pain-free at the time of discharge.  Patient encouraged to return to the nearest emergency department should he experience recurrent chest pain.    CONDITION ON DISCHARGE  Stable.      VITAL SIGNS  /78   Pulse 65   Temp 97.7 °F (36.5 °C) (Oral)   Resp 16   Ht 175.3 cm (69\")   Wt 84 kg (185 lb 1.6 oz)   SpO2 99%   BMI 27.33 kg/m²   Objective:  General Appearance:  Comfortable, well-appearing and in no acute distress.    Vital signs: (most recent): Blood pressure 134/78, pulse 58, temperature 96.8 °F (36 °C), temperature source Oral, resp. rate 14, height 175.3 cm (69\"), weight 84 kg (185 lb 1.6 oz), SpO2 99 %.  Vital signs are normal.    HEENT: Normal HEENT exam.    Lungs:  Normal effort.  Breath sounds clear to auscultation.  No rales, wheezes or rhonchi.    Heart: Normal rate.  S1 normal and S2 normal.  No murmur, gallop or friction rub.   Chest: Symmetric chest wall expansion.   Abdomen: Abdomen is soft and non-distended.  Bowel sounds are normal.   There is no abdominal tenderness.     Extremities: Normal range of motion.  There is no deformity or dependent edema.    Pulses: Distal pulses are intact.    Neurological: Patient is alert and oriented to person, place and time.  Normal strength.    Skin:  Warm and dry.  No rash or ulceration.         Present during " visit: Patient's significant other    DISCHARGE DISPOSITION   Home or Self Care    DISCHARGE MEDICATIONS     Discharge Medications      New Medications      Instructions Start Date   aspirin 81 MG EC tablet   81 mg, Oral, Daily   Start Date:  10/12/2019     atorvastatin 40 MG tablet  Commonly known as:  LIPITOR   40 mg, Oral, Nightly      cefdinir 300 MG capsule  Commonly known as:  OMNICEF   300 mg, Oral, Every 12 Hours Scheduled      doxycycline 100 MG capsule  Commonly known as:  MONODOX   100 mg, Oral, Every 12 Hours Scheduled      isosorbide mononitrate 30 MG 24 hr tablet  Commonly known as:  IMDUR   30 mg, Oral, Every 24 Hours Scheduled, Do not take if BP <100/60   Start Date:  10/12/2019     Lactobacillus tablet   1 tablet, Oral, Daily      metoprolol tartrate 25 MG tablet  Commonly known as:  LOPRESSOR   25 mg, Oral, Every 12 Hours Scheduled, Do not take if BP <100/60 and/or HR <60   Start Date:  10/12/2019        Changes to Medications      Instructions Start Date   benazepril 20 MG tablet  Commonly known as:  LOTENSIN  What changed:  additional instructions   20 mg, Oral, Daily, Do not take if BP <100/60         Continue These Medications      Instructions Start Date   acetaminophen-codeine 300-30 MG per tablet  Commonly known as:  TYLENOL #3   1 tablet, Oral, 2 Times Daily PRN      clopidogrel 75 MG tablet  Commonly known as:  PLAVIX   75 mg, Oral, Daily      fenofibrate 145 MG tablet  Commonly known as:  TRICOR   145 mg, Oral, Daily      gabapentin 800 MG tablet  Commonly known as:  NEURONTIN   800 mg, Oral, 3 Times Daily      metFORMIN 1000 MG tablet  Commonly known as:  GLUCOPHAGE   1,000 mg, Oral, 2 Times Daily With Meals             DISCHARGE DIET  cardiac diet, diabetic diet    ACTIVITY AT DISCHARGE   activity as tolerated    Additional Instructions for the Follow-ups that You Need to Schedule     Discharge Follow-up with PCP   As directed       Currently Documented PCP:    Bridget Perdomo, APRN     PCP Phone Number:    [phone]     Follow Up Details:  1 week; hospital follow up chest pain         Discharge Follow-up with Specified Provider: Dr. Rust; 2 Weeks   As directed      To:  Dr. Rust    Follow Up:  2 Weeks    Follow Up Details:  hospital follow up chest pain; intermediate stress test               TEST  RESULTS PENDING AT DISCHARGE     None    Saumya Hobson DO  10/11/19  5:30 PM      Time: less than 30 minutes.

## 2019-10-11 NOTE — PROGRESS NOTES
Pharmacy was consulted for tobacco cessation education. Patient currently smokes about 1 PPD. He states he was able to quit for ~3 years by tapering himself off cigarettes but then started smoking again. He says he may be interested in NRT in the future but not right now. Patient was counseled/encouraged to quit and was provided with educational materials to take home.    Thank you,  Nelida Kurtz, [facility]

## 2019-10-11 NOTE — CONSULTS
Inpatient Cardiology Consult  Consult performed by: Sav Rust MD  Consult ordered by: Rosas Tierney MD          Patient Identification:    Name:  Kary Segundo  Age:  53 y.o.  Sex:  male  :  1965  MRN:  [id number]  Visit Number:  [id number]  Primary care provider:  Bridget Perdomo APRN    Chief complaint:   Chest pain    History of presenting illness:   Patient is a 53-year-old gentleman with history of diabetes mellitus type 2, hypertension, dyslipidemia, presenting with acute onset of chest pain yesterday, he initially presented to the [facility], he apparently had an allergic reaction with facial flushing and dyspnea when he presented to the ER initially he was given IV steroids and Ventolin was sent home and then later in the afternoon he presented again with worsening substernal chest pain more of a sharp nonradiating.  Initial evaluation in the [place] ER was significant for mildly elevated troponin of 0.1, there got a value of 0.056, his EKG showed no acute changes, he was then transferred here for further evaluation and management of non-STEMI.  On presentation here he has been chest pain-free.  He does have significant history of tobacco smoking.  He said that he had a stress test 2 years ago and it was reported to be normal in [place].  ---------------------------------------------------------------------------------------------------------------------  Review of Systems   Constitutional: Negative for activity change, appetite change, diaphoresis and fever.   HENT: Negative for congestion, nosebleeds and sore throat.    Respiratory: Negative for cough and shortness of breath.    Cardiovascular: Negative for chest pain, palpitations and leg swelling.   Gastrointestinal: Negative for abdominal distention, abdominal pain, blood in stool, constipation, diarrhea and vomiting.   Endocrine: Negative for cold intolerance and heat intolerance.   Genitourinary: Negative  for hematuria.   Musculoskeletal: Negative for back pain and myalgias.   Neurological: Negative for dizziness, syncope and weakness.   Psychiatric/Behavioral: Negative for confusion and suicidal ideas. The patient is not nervous/anxious.       ---------------------------------------------------------------------------------------------------------------------   Past History:   Family History   Problem Relation Age of Onset   • Diabetes Mother    • Diabetes Father    • Hypertension Father    • Diabetes Sister    • Diabetes Brother      Past Medical History:   Diagnosis Date   • AMI (acute myocardial infarction) (CMS/HCC)    • Anemia    • Elevated cholesterol    • Hyperlipidemia    • Hypertension    • Type 2 diabetes mellitus (CMS/[facility])      Past Surgical History:   Procedure Laterality Date   • CIRCUMCISION     • COLONOSCOPY     • SHOULDER SURGERY     • UMBILICAL HERNIA REPAIR       Social History     Socioeconomic History   • Marital status:      Spouse name: Not on file   • Number of children: Not on file   • Years of education: Not on file   • Highest education level: Not on file   Tobacco Use   • Smoking status: Current Every Day Smoker     Packs/day: 1.00     Years: 15.00     Pack years: 15.00     Types: Cigarettes     Start date: 10/11/2006   Substance and Sexual Activity   • Alcohol use: No     Frequency: Never   • Drug use: No   • Sexual activity: Defer     ---------------------------------------------------------------------------------------------------------------------   Allergies:  Patient has no known allergies.  ---------------------------------------------------------------------------------------------------------------------   Prior to Admission Medications     Prescriptions Last Dose Informant Patient Reported? Taking?    acetaminophen-codeine (TYLENOL #3) 300-30 MG per tablet 10/9/2019 Self Yes Yes    Take 1 tablet by mouth 2 (Two) Times a Day As Needed for Moderate Pain .    benazepril  (LOTENSIN) 20 MG tablet 10/10/2019 Self Yes Yes    Take 20 mg by mouth Daily.    clopidogrel (PLAVIX) 75 MG tablet 10/10/2019 Self Yes Yes    Take 75 mg by mouth Daily.    fenofibrate (TRICOR) 145 MG tablet 10/10/2019 Self Yes Yes    Take 145 mg by mouth Daily.    gabapentin (NEURONTIN) 800 MG tablet 10/10/2019 Self Yes Yes    Take 800 mg by mouth 3 (Three) Times a Day.    metFORMIN (GLUCOPHAGE) 1000 MG tablet 10/10/2019 Self Yes Yes    Take 1,000 mg by mouth 2 (Two) Times a Day With Meals.        Hospital Meds:    aspirin 81 mg Oral Daily   atorvastatin 40 mg Oral Nightly   cefdinir 300 mg Oral Q12H   clopidogrel 75 mg Oral Daily   doxycycline 100 mg Oral Q12H   gabapentin 800 mg Oral TID   influenza vaccine 0.5 mL Intramuscular Once   insulin aspart 0-7 Units Subcutaneous 4x Daily AC & at Bedtime   ipratropium 0.5 mg Nebulization Q6H   nicotine 1 patch Transdermal Q24H   sodium chloride 10 mL Intravenous Q12H       heparin (porcine) 11.9 Units/kg/hr Last Rate: 11.9 Units/kg/hr (10/11/19 0801)     ---------------------------------------------------------------------------------------------------------------------   Vital Signs:  Temp:  [97.7 °F (36.5 °C)] 97.7 °F (36.5 °C)  Heart Rate:  [60-84] 80  Resp:  [14-20] 16  BP: (113-140)/(50-79) 133/71      10/11/19  0323   Weight: 84 kg (185 lb 1.6 oz)     Body mass index is 27.33 kg/m².  ---------------------------------------------------------------------------------------------------------------------   Physical exam:   Constitutional:  Well-developed and well-nourished.  No respiratory distress.      HENT:  Head: Normocephalic and atraumatic.  Mouth:  Moist mucous membranes.    Eyes:  Conjunctivae and EOM are normal.  Pupils are equal, round, and reactive to light.  No scleral icterus.  Neck:  Neck supple.  No JVD present.    Cardiovascular:  Normal rate, regular rhythm and normal heart sounds with no murmur.  Pulmonary/Chest:  No respiratory distress, no wheezes, no  crackles, with normal breath sounds and good air movement.  Abdominal:  Soft.  Bowel sounds are normal.  No distension and no tenderness.   Musculoskeletal:  No edema, no tenderness, and no deformity.  No red or swollen joints anywhere.    Neurological:  Alert and oriented to person, place, and time.  No cranial nerve deficit.  No tongue deviation.  No facial droop.  No slurred speech.   Skin:  Skin is warm and dry.  No rash noted.  No pallor.   Psychiatric:  Normal mood and affect.  Behavior is normal.  Judgment and thought content normal.   Peripheral vascular:  No edema and strong pulses on all 4 extremities.    ---------------------------------------------------------------------------------------------------------------------   EKG: Sinus rhythm, no acute ST-T changes  Telemetry: Sinus  I have personally looked at both the EKG and the telemetry strips.  Echo:     ---------------------------------------------------------------------------------------------------------------------   Results from last 7 days   Lab Units 10/11/19  0707 10/11/19  0430   LACTATE mmol/L  --  2.2*   WBC 10*3/mm3  --  15.76*   HEMOGLOBIN g/dL  --  13.2   HEMATOCRIT %  --  40.4   MCV fL  --  91.4   MCHC g/dL  --  32.7   PLATELETS 10*3/mm3  --  201   INR  1.11*  --          Results from last 7 days   Lab Units 10/11/19  0430   SODIUM mmol/L 137   POTASSIUM mmol/L 4.4   MAGNESIUM mg/dL 1.8   CHLORIDE mmol/L 100   CO2 mmol/L 22.8   BUN mg/dL 15   CREATININE mg/dL 0.83   EGFR IF NONAFRICN AM mL/min/1.73 97   CALCIUM mg/dL 9.4   PHOSPHORUS mg/dL 4.0   GLUCOSE mg/dL 322*   ALBUMIN g/dL 4.27   BILIRUBIN mg/dL 0.4   ALK PHOS U/L 69   AST (SGOT) U/L 13   ALT (SGPT) U/L 9   Estimated Creatinine Clearance: 123.7 mL/min (by C-G formula based on SCr of 0.83 mg/dL).  No results found for: AMMONIA  Results from last 7 days   Lab Units 10/11/19  0430   TROPONIN T ng/mL <0.010         Lab Results   Component Value Date    HGBA1C 8.70 (H) 10/11/2019      Lab Results   Component Value Date    TSH 0.282 10/11/2019     No results found for: PREGTESTUR, PREGSERUM, HCG, HCGQUANT  Pain Management Panel     There is no flowsheet data to display.                          ---------------------------------------------------------------------------------------------------------------------   Imaging Results (last 7 days)     ** No results found for the last 168 hours. **        ----------------------------------------------------------------------------------------------------------------------  Assessment:   Chest pain, reported to have mildly elevated troponin at outside facility, troponin here has been negative.  EKG showed no acute ST changes.  Chest pain itself sounds atypical.  Leukocytosis with mildly elevated lactate, no clinical focus for sepsis.  Primary team evaluating.  Hypertension  Diabetes mellitus type 2, uncontrolled  Dyslipidemia  Chronic active tobacco smoking.      Plan:   This chest pain in the setting of an allergic reaction sounds more of any pericarditic type, will get an echocardiogram and stress perfusion imaging study for evaluation of CAD due to multiple risk factors.  Recommend stopping the heparin drip as his troponins have been negative here.  He has been initiated on aspirin Plavix by admitting team.  He is also initiated on Lipitor for his dyslipidemia.    Thank you for the consult.      Sav Rust MD, [facility]  Interventional Cardiology      10/11/19  8:36 AM       Addendum:  Reviewed the patient's stress test and echocardiographic results with the patient.  Stress test showed a inferior infarct pattern with mild bong-infarct ischemia, normal LV wall motion and EF, echocardiogram did not show any significant abnormalities.  He is chest pain-free now.  Troponin has been negative x2.  He is currently not on any antianginal medication.  I would start him on metoprolol tartrate 25 mg twice daily and Imdur 30 mg daily along with aspirin  Plavix and statin.  Follow him up in my clinic in 2 to 4 weeks time, if he continues to have anginal type chest pain on no medical management then we would consider proceeding with a coronary angiogram with possible PCI.  Patient verbalized understanding and promised he will take his medications and follow-up with me in the office.

## 2019-10-11 NOTE — CONSULTS
Patient resting comfortably in bed, alert and oriented.  Made patient aware of current HgB A1C of 8.7.  Patient reports he is a smoker and drinks 4 Mountain dews per day along with lots of chocolate milk.  Patient reports neuropathy.  Counseled patient on the role that his smoking and diet play on his diabetes and complications.  Counseled patient on diabetes basic handout which discusses exactly what diabetes is and how it affects the body.  Counseled patient on complications of hyperglycemia and ways to prevent it.  Counseled patient on hypoglycemia and treatment by using the rule of 15.  Counseled patient on the importance of checking blood glucose levels frequently and keeping a log to take to all MD appointments.  Counseled patient on how to care for themselves during sick day.  Stressed the importance of healthy eating with a limit of carbohydrates to 180 per day.  Counseled on importance of complying with medications as ordered by provider.  Counseled on the importance of daily foot inspections.  Counseled patient to seek medical attention if blood glucose above 300.  Counseled on importance of daily exercise. Patient verbalizes understanding of all information given. Encouraged patient to attend an outpatient diabetes smart class or attend diabetes support group.  Left time and dates of classes with patient along with my name and contact information, will continue to monitor patient as needed.

## 2019-10-11 NOTE — PAYOR COMM NOTE
"  [facility]  NPI: [id number]    Utilization Review   Contact:Ida Everett MSN, APRN, NP-C  Phone: [phone]  Fax: [phone]    [employer]/Attn: cande  Inpatient Auth Req/secondary to medicare  REF: [id number]  DX: I20.0, J44.1  Kary Lira (53 y.o. Male)     Date of Birth Social Security Number Address Home Phone MRN    1965  [id number]  [place] [address] [phone] [id number]    Rastafari Marital Status          None        Admission Date Admission Type Admitting Provider Attending Provider Department, Room/Bed    10/11/19 Urgent Rosas Tierney MD Grace, Aimee Russell, DO [facility] PROGRESS CARE, P222/1P    Discharge Date Discharge Disposition Discharge Destination                       Attending Provider:  Saumya Hobson DO    Allergies:  No Known Allergies    Isolation:  None   Infection:  None   Code Status:  CPR    Ht:  175.3 cm (69\")   Wt:  84 kg (185 lb 1.6 oz)    Admission Cmt:  None   Principal Problem:  None                Active Insurance as of 10/11/2019     Primary Coverage     Payor Plan Insurance Group Employer/Plan Group    MEDICARE MEDICARE A & B      Payor Plan Address Payor Plan Phone Number Payor Plan Fax Number Effective Dates    [address] [phone]  1/1/2004 - None Entered    [facility] [id number]       Subscriber Name Subscriber Birth Date Member ID       KARY LIRA 1965 [id number]           Secondary Coverage     Payor Plan Insurance Group Employer/Plan Group    [employer] [place] [employer] [place]      Payor Plan Address Payor Plan Phone Number Payor Plan Fax Number Effective Dates    [address] [address] [phone]  10/11/2019 - None Entered    [facility] [address]       Subscriber Name Subscriber Birth Date Member ID       KARY LIRA 1965 [id number]                 Emergency Contacts      (Rel.) Home Phone Work Phone Mobile Phone    ARIAN POWELLLEY (Daughter) " [phone] -- --

## 2019-10-11 NOTE — PROGRESS NOTES
Brief Hospital Medicine Note:    Patient seen earlier this morning by Dr. Tierney. Patient seen this morning with CHRISTIANA Delaney. He is currently chest pain free. No cough or wheezing this morning. He states that he had a MI in 2016, dx at [facility] and treated medically. This episode of chest pain was different. It was located in the center of his chest, sharp in nature, did not radiate and was not associated with any other symptoms. It did not improve with rest.     His exam is unremarkable. Heart is RRR, no m/g/r. Lungs are CTAB. No lower extremity edema.     He has been seen by Cardiology with plans for a stress test and with recommendations to stop his heparin infusion. Continue ASA, atorvastatin, plavix and plan to begin metoprolol.     Echocardiogram ordered. Continue with nicotine replacement therapy. I have ordered a lipid panel.     Possible d/c later this evening if stress test/echocardiogram normal and patient remains asymptomatic.

## 2019-10-11 NOTE — PLAN OF CARE
Problem: Patient Care Overview  Goal: Plan of Care Review  Outcome: Ongoing (interventions implemented as appropriate)    Goal: Individualization and Mutuality  Outcome: Ongoing (interventions implemented as appropriate)    Goal: Discharge Needs Assessment  Outcome: Ongoing (interventions implemented as appropriate)      Problem: Pain, Chronic (Adult)  Goal: Identify Related Risk Factors and Signs and Symptoms  Outcome: Ongoing (interventions implemented as appropriate)    Goal: Acceptable Pain/Comfort Level and Functional Ability  Outcome: Ongoing (interventions implemented as appropriate)      Problem: Cardiac: ACS (Acute Coronary Syndrome) (Adult)  Goal: Signs and Symptoms of Listed Potential Problems Will be Absent, Minimized or Managed (Cardiac: ACS)  Outcome: Ongoing (interventions implemented as appropriate)      Problem: Diabetes, Type 2 (Adult)  Goal: Signs and Symptoms of Listed Potential Problems Will be Absent, Minimized or Managed (Diabetes, Type 2)  Outcome: Ongoing (interventions implemented as appropriate)

## 2019-10-11 NOTE — PROGRESS NOTES
Discharge Planning Assessment   [place]     Patient Name: Kary Segundo  MRN: [id number]  Today's Date: 10/11/2019    Admit Date: 10/11/2019    Discharge Needs Assessment     Row Name 10/11/19 1528       Living Environment    Lives With  spouse Pt lives at home with his spouse, Kelly.     Current Living Arrangements  home/apartment/condo    Primary Care Provided by  self    Provides Primary Care For  no one    Family Caregiver if Needed  spouse    Quality of Family Relationships  helpful;involved;supportive    Able to Return to Prior Arrangements  yes       Transition Planning    Patient/Family Anticipates Transition to  home    Transportation Anticipated  family or friend will provide       Discharge Needs Assessment    Equipment Currently Used at Home  none    Equipment Needed After Discharge  none    Outpatient/Agency/Support Group Needs  -- Pt lives does not utilize home health services.         Discharge Plan     Row Name 10/11/19 1529       Plan    Plan  Pt lives at home with his spouse and plans to return home at discharge. Pt does not utilize home health services or DME. Pt's PCP is Bridget Perdomo. Pt does not have a POA or advance directive. Pt utilizes [facility] for prescriptions. Pt's family to provide transportation at discharge. CM will follow and assist as needed with discharge planning.     Patient/Family in Agreement with Plan  yes     Demographic Summary     Row Name 10/11/19 1527       General Information    Admission Type  inpatient    Referral Source  nursing    Reason for Consult  discharge planning    Preferred Language  English     Used During This Interaction  no   SONIA Charles

## 2019-10-12 NOTE — [facility] COMM NOTE
[facility]   [facility]  PHONE  [phone]  FAX  [phone]  NPI:  [id number]    PATIENT D/C 10/11/19      Kary Lira (53 y.o. Male)     Date of Birth Social Security Number Address Home Phone MRN    1965  [address]  [place] [address] [phone] [id number]    [employer] Marital Status          None        Admission Date Admission Type Admitting Provider Attending Provider Department, Room/Bed    10/11/19 Urgent Rosas Tierney MD  [facility] PROGRESS CARE, P222/1P    Discharge Date Discharge Disposition Discharge Destination        10/11/2019 Home or Self Care              Attending Provider:  (none)   Allergies:  No Known Allergies    Isolation:  None   Infection:  None   Code Status:  Prior    Ht:  175.3 cm (69\")   Wt:  84 kg (185 lb 1.6 oz)    Admission Cmt:  None   Principal Problem:  None                Active Insurance as of 10/11/2019     Primary Coverage     Payor Plan Insurance Group Employer/Plan Group    MEDICARE MEDICARE A & B      Payor Plan Address Payor Plan Phone Number Payor Plan Fax Number Effective Dates    [address] [phone]  1/1/2004 - None Entered    [facility] [address]       Subscriber Name Subscriber Birth Date Member ID       KARY LIRA 1965 [id number]           Secondary Coverage     Payor Plan Insurance Group Employer/Plan Group    [employer] [place] [place]      Payor Plan Address Payor Plan Phone Number Payor Plan Fax Number Effective Dates    [address] [address] [phone]  10/11/2019 - None Entered    [facility] [address]       Subscriber Name Subscriber Birth Date Member ID       KARY LIRA 1965 [id number]                 Emergency Contacts      (Rel.) Home Phone Work Phone Mobile Phone    ALAN POWELL (Daughter) [phone] -- --              "

## 2020-02-26 ENCOUNTER — TRANSCRIBE ORDERS (OUTPATIENT)
Dept: ADMINISTRATIVE | Facility: HOSPITAL | Age: 55
End: 2020-02-26

## 2020-02-26 ENCOUNTER — HOSPITAL ENCOUNTER (OUTPATIENT)
Dept: GENERAL RADIOLOGY | Facility: HOSPITAL | Age: 55
Discharge: HOME | End: 2020-02-26
Admitting: ANESTHESIOLOGY

## 2020-02-26 ENCOUNTER — HOSPITAL ENCOUNTER (OUTPATIENT)
Dept: GENERAL RADIOLOGY | Facility: HOSPITAL | Age: 55
Discharge: HOME | End: 2020-02-26

## 2020-02-26 DIAGNOSIS — M54.50 LOW BACK PAIN, UNSPECIFIED BACK PAIN LATERALITY, UNSPECIFIED CHRONICITY, UNSPECIFIED WHETHER SCIATICA PRESENT: ICD-10-CM

## 2020-02-26 DIAGNOSIS — M54.2 CERVICALGIA: Primary | ICD-10-CM

## 2020-02-26 DIAGNOSIS — M54.2 CERVICALGIA: ICD-10-CM

## 2020-02-26 PROCEDURE — 72050 X-RAY EXAM NECK SPINE 4/5VWS: CPT

## 2020-02-26 PROCEDURE — 72052 X-RAY EXAM NECK SPINE 6/>VWS: CPT | Performed by: RADIOLOGY

## 2020-02-26 PROCEDURE — 72110 X-RAY EXAM L-2 SPINE 4/>VWS: CPT

## 2020-02-26 PROCEDURE — 72110 X-RAY EXAM L-2 SPINE 4/>VWS: CPT | Performed by: RADIOLOGY

## 2020-12-04 ENCOUNTER — TRANSCRIBE ORDERS (OUTPATIENT)
Dept: ADMINISTRATIVE | Facility: HOSPITAL | Age: 55
End: 2020-12-04

## 2020-12-04 DIAGNOSIS — M54.16 LUMBAR RADICULOPATHY: Primary | ICD-10-CM

## 2020-12-17 ENCOUNTER — APPOINTMENT (OUTPATIENT)
Dept: MRI IMAGING | Facility: HOSPITAL | Age: 55
End: 2020-12-17

## 2020-12-31 ENCOUNTER — HOSPITAL ENCOUNTER (OUTPATIENT)
Dept: MRI IMAGING | Facility: HOSPITAL | Age: 55
Discharge: HOME | End: 2020-12-31
Admitting: ANESTHESIOLOGY

## 2020-12-31 DIAGNOSIS — M54.16 LUMBAR RADICULOPATHY: ICD-10-CM

## 2020-12-31 PROCEDURE — 72148 MRI LUMBAR SPINE W/O DYE: CPT

## 2020-12-31 PROCEDURE — 72148 MRI LUMBAR SPINE W/O DYE: CPT | Performed by: RADIOLOGY

## 2021-04-07 ENCOUNTER — OFFICE VISIT (OUTPATIENT)
Dept: ORTHOPEDIC SURGERY | Facility: CLINIC | Age: 56
End: 2021-04-07

## 2021-04-07 VITALS
DIASTOLIC BLOOD PRESSURE: 78 MMHG | WEIGHT: 183 LBS | SYSTOLIC BLOOD PRESSURE: 132 MMHG | HEART RATE: 73 BPM | HEIGHT: 66 IN | TEMPERATURE: 99.3 F | BODY MASS INDEX: 29.41 KG/M2

## 2021-04-07 DIAGNOSIS — Z01.818 PREOPERATIVE TESTING: Primary | ICD-10-CM

## 2021-04-07 DIAGNOSIS — M65.331 TRIGGER FINGER, RIGHT MIDDLE FINGER: ICD-10-CM

## 2021-04-07 DIAGNOSIS — R50.9 FEVER, UNSPECIFIED: ICD-10-CM

## 2021-04-07 DIAGNOSIS — M65.321 TRIGGER FINGER, RIGHT INDEX FINGER: ICD-10-CM

## 2021-04-07 DIAGNOSIS — G56.01 CARPAL TUNNEL SYNDROME, RIGHT: ICD-10-CM

## 2021-04-07 PROCEDURE — 99204 OFFICE O/P NEW MOD 45 MIN: CPT | Performed by: ORTHOPAEDIC SURGERY

## 2021-04-07 RX ORDER — NITROGLYCERIN 0.4 MG/1
TABLET SUBLINGUAL
COMMUNITY
Start: 2021-03-24

## 2021-04-07 NOTE — PROGRESS NOTES
History and Physical      Patient: Kary Segundo  YOB: 1965  Date of Encounter: 04/07/2021      Chief Complaint:   Chief Complaint   Patient presents with   • Right Hand - Initial Evaluation, Pain, Numbness, Tingling   • Left Hand - Initial Evaluation, Pain, Numbness, Tingling           HPI:   Kary Segundo, 55 y.o. male, presents for evaluation of bilateral hand pain right worse than the left scribing numbness in his hands thumb through fourth fingers.  In addition to the above complaints he complains that he has locking of his third finger right hand and inability to flex his right index.  He reports no trauma recent or remote.  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  Hand pain is worsening.  He complains of significant pain at night has difficulty sleeping.  His medical history is remarkable for previous MI and diabetes type 2.  Family history is significant for heart disease and diabetes.  Social history positive for tobacco.  Currently takes Neurontin and Plavix.        Active Problem List:  Patient Active Problem List   Diagnosis   • Trigger finger, right index finger   • Trigger finger, right middle finger   • Carpal tunnel syndrome, right           Past Medical History:  Past Medical History:   Diagnosis Date   • AMI (acute myocardial infarction) (CMS/HCC)    • Anemia    • Elevated cholesterol    • Hyperlipidemia    • Hypertension    • Type 2 diabetes mellitus (CMS/HCC)            Past Surgical History:  Past Surgical History:   Procedure Laterality Date   • CIRCUMCISION     • COLONOSCOPY     • SHOULDER SURGERY     • UMBILICAL HERNIA REPAIR             Family History:  Family History   Problem Relation Age of Onset   • Diabetes Mother    • Osteoarthritis Mother    • Osteoporosis Mother    • Heart disease Mother    • Diabetes Father    • Hypertension Father    • Cancer Father    • Diabetes Sister    • Diabetes Brother            Social  History:  Social History     Socioeconomic History   • Marital status:      Spouse name: Not on file   • Number of children: Not on file   • Years of education: Not on file   • Highest education level: Not on file   Tobacco Use   • Smoking status: Current Every Day Smoker     Packs/day: 1.00     Years: 15.00     Pack years: 15.00     Types: Cigarettes     Start date: 10/11/2006   • Smokeless tobacco: Never Used   Vaping Use   • Vaping Use: Some days   • Substances: Nicotine, Flavoring   • Devices: Disposable   Substance and Sexual Activity   • Alcohol use: No   • Drug use: No   • Sexual activity: Defer     Body mass index is 29.54 kg/m².        Medications:  Current Outpatient Medications   Medication Sig Dispense Refill   • benazepril (LOTENSIN) 20 MG tablet Take 1 tablet by mouth Daily. Do not take if BP <100/60     • clopidogrel (PLAVIX) 75 MG tablet Take 75 mg by mouth Daily.     • Diclofenac Sodium (VOLTAREN) 1 % gel gel APPLY 2 GRAMS TO AFFECTED AREA FOUR TIMES A DAY AS NEEDED FOR PAIN     • fenofibrate (TRICOR) 145 MG tablet Take 145 mg by mouth Daily.     • gabapentin (NEURONTIN) 800 MG tablet Take 800 mg by mouth 3 (Three) Times a Day.     • metFORMIN (GLUCOPHAGE) 1000 MG tablet Take 1,000 mg by mouth 2 (Two) Times a Day With Meals.     • nitroglycerin (NITROSTAT) 0.4 MG SL tablet PLACE 1 TABLET UNDER TONGUE AND LET DISSOLVE, MAY REPEAT EVERY FIVE MINUTES FOR UP TO 3 DOSES IF CHEST PAIN DOES NOT CEASE GO TO THE E.R.       No current facility-administered medications for this visit.           Allergies:  No Known Allergies        Review of Systems:   Review of Systems   HENT: Negative.    Eyes: Negative.    Respiratory: Negative.    Cardiovascular: Negative.    Gastrointestinal: Negative.    Endocrine: Negative.    Genitourinary: Negative.    Musculoskeletal: Positive for arthralgias and back pain.   Skin: Negative.    Allergic/Immunologic: Negative.    Neurological: Positive for weakness and  "numbness.   Hematological: Negative.    Psychiatric/Behavioral: The patient is nervous/anxious.            Physical Exam:   Physical Exam  Vitals and nursing note reviewed.   Constitutional:       General: He is not in acute distress.     Appearance: He is not diaphoretic.   HENT:      Head: Normocephalic and atraumatic.      Right Ear: External ear normal.      Left Ear: External ear normal.   Eyes:      General:         Right eye: No discharge.         Left eye: No discharge.      Conjunctiva/sclera: Conjunctivae normal.   Cardiovascular:      Rate and Rhythm: Normal rate and regular rhythm.      Heart sounds: Normal heart sounds. No murmur heard.     Pulmonary:      Effort: Pulmonary effort is normal. No respiratory distress.      Breath sounds: Normal breath sounds. No wheezing.   Abdominal:      General: There is no distension.      Palpations: Abdomen is soft.      Tenderness: There is no guarding.   Musculoskeletal:      Cervical back: Normal range of motion and neck supple.   Skin:     General: Skin is warm and dry.      Capillary Refill: Capillary refill takes less than 2 seconds.   Neurological:      Mental Status: He is alert and oriented to person, place, and time.   Psychiatric:         Behavior: Behavior normal.         Thought Content: Thought content normal.         Judgment: Judgment normal.       GENERAL: 55 y.o. male, alert and oriented X 3 in no acute distress.   Visit Vitals  /78   Pulse 73   Temp 99.3 °F (37.4 °C)   Ht 167.6 cm (66\")   Wt 83 kg (183 lb)   BMI 29.54 kg/m²         Musculoskeletal:   Examination bilateral hands reveals normal thenar tone.  Diminished sensation primarily thumb index and middle fingers bilaterally greater on the right.  Right hand third finger with obvious triggering with palpable tenderness directly over A1 pulley within the palm.  Right index with limited flexion of 60 degrees at the PIP joint and 50 degrees at the MCP joint.  He has marked tenderness in his " palm directly over A1 pulley to the index finger.  Phalen sign is positive bilaterally.  Tinel's sign is negative on the right.        EMG/NCS:        Assessment & Plan:   55 y.o. male presents with 2 to 3-year history of bilateral hand pain right is greater with clinical findings consistent with carpal tunnel syndrome supported by EMG nerve conduction studies confirming carpal tunnel syndrome described as severe.  He is recommended carpal tunnel release and also release of right third trigger finger and right index trigger finger.  Surgery to be performed at [facility] April 22, 2021 he will hold his Plavix 7 days preoperatively.      ICD-10-CM ICD-9-CM   1. Preoperative testing  Z01.818 V72.84   2. Carpal tunnel syndrome, right  G56.01 354.0   3. Trigger finger, right middle finger  M65.331 727.03   4. Trigger finger, right index finger  M65.321 727.03   5. Fever, unspecified   R50.9 780.60           Cc:   Bridget Perdomo, KVNG              This document has been electronically signed by Joesph Queen MD   April 9, 2021 11:55 EDT

## 2021-04-07 NOTE — H&P (VIEW-ONLY)
History and Physical      Patient: Kary Segundo  YOB: 1965  Date of Encounter: 04/07/2021      Chief Complaint:   Chief Complaint   Patient presents with   • Right Hand - Initial Evaluation, Pain, Numbness, Tingling   • Left Hand - Initial Evaluation, Pain, Numbness, Tingling           HPI:   Kary Segundo, 55 y.o. male, presents for evaluation of bilateral hand pain right worse than the left scribing numbness in his hands thumb through fourth fingers.  In addition to the above complaints he complains that he has locking of his third finger right hand and inability to flex his right index.  He reports no trauma recent or remote.  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  Hand pain is worsening.  He complains of significant pain at night has difficulty sleeping.  His medical history is remarkable for previous MI and diabetes type 2.  Family history is significant for heart disease and diabetes.  Social history positive for tobacco.  Currently takes Neurontin and Plavix.        Active Problem List:  Patient Active Problem List   Diagnosis   • Trigger finger, right index finger   • Trigger finger, right middle finger   • Carpal tunnel syndrome, right           Past Medical History:  Past Medical History:   Diagnosis Date   • AMI (acute myocardial infarction) (CMS/HCC)    • Anemia    • Elevated cholesterol    • Hyperlipidemia    • Hypertension    • Type 2 diabetes mellitus (CMS/HCC)            Past Surgical History:  Past Surgical History:   Procedure Laterality Date   • CIRCUMCISION     • COLONOSCOPY     • SHOULDER SURGERY     • UMBILICAL HERNIA REPAIR             Family History:  Family History   Problem Relation Age of Onset   • Diabetes Mother    • Osteoarthritis Mother    • Osteoporosis Mother    • Heart disease Mother    • Diabetes Father    • Hypertension Father    • Cancer Father    • Diabetes Sister    • Diabetes Brother            Social  History:  Social History     Socioeconomic History   • Marital status:      Spouse name: Not on file   • Number of children: Not on file   • Years of education: Not on file   • Highest education level: Not on file   Tobacco Use   • Smoking status: Current Every Day Smoker     Packs/day: 1.00     Years: 15.00     Pack years: 15.00     Types: Cigarettes     Start date: 10/11/2006   • Smokeless tobacco: Never Used   Vaping Use   • Vaping Use: Some days   • Substances: Nicotine, Flavoring   • Devices: Disposable   Substance and Sexual Activity   • Alcohol use: No   • Drug use: No   • Sexual activity: Defer     Body mass index is 29.54 kg/m².        Medications:  Current Outpatient Medications   Medication Sig Dispense Refill   • benazepril (LOTENSIN) 20 MG tablet Take 1 tablet by mouth Daily. Do not take if BP <100/60     • clopidogrel (PLAVIX) 75 MG tablet Take 75 mg by mouth Daily.     • Diclofenac Sodium (VOLTAREN) 1 % gel gel APPLY 2 GRAMS TO AFFECTED AREA FOUR TIMES A DAY AS NEEDED FOR PAIN     • fenofibrate (TRICOR) 145 MG tablet Take 145 mg by mouth Daily.     • gabapentin (NEURONTIN) 800 MG tablet Take 800 mg by mouth 3 (Three) Times a Day.     • metFORMIN (GLUCOPHAGE) 1000 MG tablet Take 1,000 mg by mouth 2 (Two) Times a Day With Meals.     • nitroglycerin (NITROSTAT) 0.4 MG SL tablet PLACE 1 TABLET UNDER TONGUE AND LET DISSOLVE, MAY REPEAT EVERY FIVE MINUTES FOR UP TO 3 DOSES IF CHEST PAIN DOES NOT CEASE GO TO THE E.R.       No current facility-administered medications for this visit.           Allergies:  No Known Allergies        Review of Systems:   Review of Systems   HENT: Negative.    Eyes: Negative.    Respiratory: Negative.    Cardiovascular: Negative.    Gastrointestinal: Negative.    Endocrine: Negative.    Genitourinary: Negative.    Musculoskeletal: Positive for arthralgias and back pain.   Skin: Negative.    Allergic/Immunologic: Negative.    Neurological: Positive for weakness and  "numbness.   Hematological: Negative.    Psychiatric/Behavioral: The patient is nervous/anxious.            Physical Exam:   Physical Exam  Vitals and nursing note reviewed.   Constitutional:       General: He is not in acute distress.     Appearance: He is not diaphoretic.   HENT:      Head: Normocephalic and atraumatic.      Right Ear: External ear normal.      Left Ear: External ear normal.   Eyes:      General:         Right eye: No discharge.         Left eye: No discharge.      Conjunctiva/sclera: Conjunctivae normal.   Cardiovascular:      Rate and Rhythm: Normal rate and regular rhythm.      Heart sounds: Normal heart sounds. No murmur heard.     Pulmonary:      Effort: Pulmonary effort is normal. No respiratory distress.      Breath sounds: Normal breath sounds. No wheezing.   Abdominal:      General: There is no distension.      Palpations: Abdomen is soft.      Tenderness: There is no guarding.   Musculoskeletal:      Cervical back: Normal range of motion and neck supple.   Skin:     General: Skin is warm and dry.      Capillary Refill: Capillary refill takes less than 2 seconds.   Neurological:      Mental Status: He is alert and oriented to person, place, and time.   Psychiatric:         Behavior: Behavior normal.         Thought Content: Thought content normal.         Judgment: Judgment normal.       GENERAL: 55 y.o. male, alert and oriented X 3 in no acute distress.   Visit Vitals  /78   Pulse 73   Temp 99.3 °F (37.4 °C)   Ht 167.6 cm (66\")   Wt 83 kg (183 lb)   BMI 29.54 kg/m²         Musculoskeletal:   Examination bilateral hands reveals normal thenar tone.  Diminished sensation primarily thumb index and middle fingers bilaterally greater on the right.  Right hand third finger with obvious triggering with palpable tenderness directly over A1 pulley within the palm.  Right index with limited flexion of 60 degrees at the PIP joint and 50 degrees at the MCP joint.  He has marked tenderness in his " palm directly over A1 pulley to the index finger.  Phalen sign is positive bilaterally.  Tinel's sign is negative on the right.        EMG/NCS:        Assessment & Plan:   55 y.o. male presents with 2 to 3-year history of bilateral hand pain right is greater with clinical findings consistent with carpal tunnel syndrome supported by EMG nerve conduction studies confirming carpal tunnel syndrome described as severe.  He is recommended carpal tunnel release and also release of right third trigger finger and right index trigger finger.  Surgery to be performed at [facility] April 22, 2021 he will hold his Plavix 7 days preoperatively.      ICD-10-CM ICD-9-CM   1. Preoperative testing  Z01.818 V72.84   2. Carpal tunnel syndrome, right  G56.01 354.0   3. Trigger finger, right middle finger  M65.331 727.03   4. Trigger finger, right index finger  M65.321 727.03   5. Fever, unspecified   R50.9 780.60           Cc:   Bridget Perdomo, KVNG              This document has been electronically signed by Joesph Queen MD   April 9, 2021 11:55 EDT

## 2021-04-07 NOTE — PROGRESS NOTES
New Patient Visit      Patient: Kary Segundo  YOB: 1965  Date of Encounter: 04/07/2021        Chief Complaint:   Chief Complaint   Patient presents with   • Right Hand - Initial Evaluation, Pain, Numbness, Tingling   • Left Hand - Initial Evaluation, Pain, Numbness, Tingling           HPI:   Kary Segundo, 55 y.o. male, referred by Bridget Perdomo APRN presents    Active Problem List:  Patient Active Problem List   Diagnosis   (none) - all problems resolved or deleted         Past Medical History:  Past Medical History:   Diagnosis Date   • AMI (acute myocardial infarction) (CMS/[facility])    • Anemia    • Elevated cholesterol    • Hyperlipidemia    • Hypertension    • Type 2 diabetes mellitus (CMS/[facility])          Past Surgical History:  Past Surgical History:   Procedure Laterality Date   • CIRCUMCISION     • COLONOSCOPY     • SHOULDER SURGERY     • UMBILICAL HERNIA REPAIR           Family History:  Family History   Problem Relation Age of Onset   • Diabetes Mother    • Osteoarthritis Mother    • Osteoporosis Mother    • Heart disease Mother    • Diabetes Father    • Hypertension Father    • Cancer Father    • Diabetes Sister    • Diabetes Brother          Social History:  Social History     Socioeconomic History   • Marital status:      Spouse name: Not on file   • Number of children: Not on file   • Years of education: Not on file   • Highest education level: Not on file   Tobacco Use   • Smoking status: Current Every Day Smoker     Packs/day: 1.00     Years: 15.00     Pack years: 15.00     Types: Cigarettes     Start date: 10/11/2006   • Smokeless tobacco: Never Used   Vaping Use   • Vaping Use: Some days   • Substances: Nicotine, Flavoring   • Devices: Disposable   Substance and Sexual Activity   • Alcohol use: No   • Drug use: No   • Sexual activity: Defer     Body mass index is 29.54 kg/m².      Medications:  Current Outpatient Medications   Medication Sig Dispense Refill   •  "benazepril (LOTENSIN) 20 MG tablet Take 1 tablet by mouth Daily. Do not take if BP <100/60     • clopidogrel (PLAVIX) 75 MG tablet Take 75 mg by mouth Daily.     • Diclofenac Sodium (VOLTAREN) 1 % gel gel APPLY 2 GRAMS TO AFFECTED AREA FOUR TIMES A DAY AS NEEDED FOR PAIN     • fenofibrate (TRICOR) 145 MG tablet Take 145 mg by mouth Daily.     • gabapentin (NEURONTIN) 800 MG tablet Take 800 mg by mouth 3 (Three) Times a Day.     • metFORMIN (GLUCOPHAGE) 1000 MG tablet Take 1,000 mg by mouth 2 (Two) Times a Day With Meals.     • nitroglycerin (NITROSTAT) 0.4 MG SL tablet PLACE 1 TABLET UNDER TONGUE AND LET DISSOLVE, MAY REPEAT EVERY FIVE MINUTES FOR UP TO 3 DOSES IF CHEST PAIN DOES NOT CEASE GO TO THE E.R.       No current facility-administered medications for this visit.         Allergies:  No Known Allergies      Review of Systems:   Review of Systems      Physical Exam:   Physical Exam  GENERAL: 55 y.o. male, alert and oriented X 3 in no acute distress.   Visit Vitals  /78   Pulse 73   Temp 99.3 °F (37.4 °C)   Ht 167.6 cm (66\")   Wt 83 kg (183 lb)   BMI 29.54 kg/m²         Musculoskeletal:   Examination         Radiology/Labs:     No radiology results for the last 30 days.        Assessment & Plan:   55 y.o. male presents      No diagnosis found.      Procedures      Cc:   Bridget Perdomo, KVNG                This document has been electronically signed by Joesph Queen MD   April 7, 2021 08:58 EDT      "

## 2021-04-08 ENCOUNTER — TELEPHONE (OUTPATIENT)
Dept: ORTHOPEDIC SURGERY | Facility: CLINIC | Age: 56
End: 2021-04-08

## 2021-04-08 NOTE — TELEPHONE ENCOUNTER
Caller: EMILI    Relationship: BENNIE HERNANDEZ, APRN- PATIENTS PCP    Best call back number: [phone]    What form or medical record are you requesting: OFFICE NOTES/PROG NOTES FROM 4/7/21-         How would you like to receive the form or medical records (pick-up, mail, fax): FAX  If fax, what is the fax number: [phone]- ATTN: EMILI    Timeframe paperwork needed: ASAP- PATIENT HAS APPT SCHEDULED WITH PCP TOMORROW - 4/9/21-    Additional notes: BENNIE HERNANDEZ'S OFFICE CALLED- SPOKE WITH EMILI- PATIENT HAD  APPT WITH DR CHEEMA YESTERDAY 4/7/21- PCP JUST WANTED OFFICE NOTES FOR PATIENTS APPT SCHEDULED TOMORROW 4/9/21  m is received to process this request.”

## 2021-04-20 ENCOUNTER — LAB (OUTPATIENT)
Dept: LAB | Facility: HOSPITAL | Age: 56
End: 2021-04-20

## 2021-04-20 ENCOUNTER — PRE-ADMISSION TESTING (OUTPATIENT)
Dept: PREADMISSION TESTING | Facility: HOSPITAL | Age: 56
End: 2021-04-20

## 2021-04-20 DIAGNOSIS — R50.9 FEVER, UNSPECIFIED: ICD-10-CM

## 2021-04-20 DIAGNOSIS — Z01.818 PREOPERATIVE TESTING: ICD-10-CM

## 2021-04-20 DIAGNOSIS — M65.321 TRIGGER FINGER, RIGHT INDEX FINGER: ICD-10-CM

## 2021-04-20 DIAGNOSIS — G56.01 CARPAL TUNNEL SYNDROME, RIGHT: ICD-10-CM

## 2021-04-20 DIAGNOSIS — M65.331 TRIGGER FINGER, RIGHT MIDDLE FINGER: ICD-10-CM

## 2021-04-20 LAB
ANION GAP SERPL CALCULATED.3IONS-SCNC: 7.8 MMOL/L (ref 5–15)
BUN SERPL-MCNC: 7 MG/DL (ref 6–20)
BUN/CREAT SERPL: 8.4 (ref 7–25)
CALCIUM SPEC-SCNC: 9.7 MG/DL (ref 8.6–10.5)
CHLORIDE SERPL-SCNC: 101 MMOL/L (ref 98–107)
CO2 SERPL-SCNC: 26.2 MMOL/L (ref 22–29)
CREAT SERPL-MCNC: 0.83 MG/DL (ref 0.76–1.27)
DEPRECATED RDW RBC AUTO: 41.5 FL (ref 37–54)
ERYTHROCYTE [DISTWIDTH] IN BLOOD BY AUTOMATED COUNT: 12.6 % (ref 12.3–15.4)
GFR SERPL CREATININE-BSD FRML MDRD: 97 ML/MIN/1.73
GLUCOSE SERPL-MCNC: 263 MG/DL (ref 65–99)
HCT VFR BLD AUTO: 45.7 % (ref 37.5–51)
HGB BLD-MCNC: 15.2 G/DL (ref 13–17.7)
MCH RBC QN AUTO: 30.2 PG (ref 26.6–33)
MCHC RBC AUTO-ENTMCNC: 33.3 G/DL (ref 31.5–35.7)
MCV RBC AUTO: 90.7 FL (ref 79–97)
PLATELET # BLD AUTO: 174 10*3/MM3 (ref 140–450)
PMV BLD AUTO: 11.5 FL (ref 6–12)
POTASSIUM SERPL-SCNC: 4.1 MMOL/L (ref 3.5–5.2)
QT INTERVAL: 352 MS
QTC INTERVAL: 387 MS
RBC # BLD AUTO: 5.04 10*6/MM3 (ref 4.14–5.8)
SODIUM SERPL-SCNC: 135 MMOL/L (ref 136–145)
WBC # BLD AUTO: 7.85 10*3/MM3 (ref 3.4–10.8)

## 2021-04-20 PROCEDURE — U0005 INFEC AGEN DETEC AMPLI PROBE: HCPCS | Performed by: ORTHOPAEDIC SURGERY

## 2021-04-20 PROCEDURE — 85027 COMPLETE CBC AUTOMATED: CPT

## 2021-04-20 PROCEDURE — U0004 COV-19 TEST NON-CDC HGH THRU: HCPCS | Performed by: ORTHOPAEDIC SURGERY

## 2021-04-20 PROCEDURE — C9803 HOPD COVID-19 SPEC COLLECT: HCPCS

## 2021-04-20 PROCEDURE — 93005 ELECTROCARDIOGRAM TRACING: CPT

## 2021-04-20 PROCEDURE — 36415 COLL VENOUS BLD VENIPUNCTURE: CPT

## 2021-04-20 PROCEDURE — 80048 BASIC METABOLIC PNL TOTAL CA: CPT

## 2021-04-20 NOTE — DISCHARGE INSTRUCTIONS

## 2021-04-21 LAB — SARS-COV-2 RNA NOSE QL NAA+PROBE: NOT DETECTED

## 2021-04-22 ENCOUNTER — HOSPITAL ENCOUNTER (OUTPATIENT)
Facility: HOSPITAL | Age: 56
Setting detail: HOSPITAL OUTPATIENT SURGERY
Discharge: HOME | End: 2021-04-22
Attending: ORTHOPAEDIC SURGERY | Admitting: ORTHOPAEDIC SURGERY

## 2021-04-22 ENCOUNTER — ANESTHESIA EVENT (OUTPATIENT)
Dept: PERIOP | Facility: HOSPITAL | Age: 56
End: 2021-04-22

## 2021-04-22 ENCOUNTER — ANESTHESIA (OUTPATIENT)
Dept: PERIOP | Facility: HOSPITAL | Age: 56
End: 2021-04-22

## 2021-04-22 VITALS
HEIGHT: 66 IN | RESPIRATION RATE: 16 BRPM | WEIGHT: 176 LBS | OXYGEN SATURATION: 98 % | BODY MASS INDEX: 28.28 KG/M2 | SYSTOLIC BLOOD PRESSURE: 108 MMHG | HEART RATE: 86 BPM | TEMPERATURE: 98.2 F | DIASTOLIC BLOOD PRESSURE: 68 MMHG

## 2021-04-22 DIAGNOSIS — M65.321 TRIGGER FINGER, RIGHT INDEX FINGER: ICD-10-CM

## 2021-04-22 DIAGNOSIS — G56.01 CARPAL TUNNEL SYNDROME, RIGHT: ICD-10-CM

## 2021-04-22 DIAGNOSIS — M65.331 TRIGGER FINGER, RIGHT MIDDLE FINGER: ICD-10-CM

## 2021-04-22 LAB — GLUCOSE BLDC GLUCOMTR-MCNC: 256 MG/DL (ref 70–130)

## 2021-04-22 PROCEDURE — 25010000002 PROPOFOL 10 MG/ML EMULSION: Performed by: NURSE ANESTHETIST, CERTIFIED REGISTERED

## 2021-04-22 PROCEDURE — 25010000002 MIDAZOLAM PER 1 MG: Performed by: NURSE ANESTHETIST, CERTIFIED REGISTERED

## 2021-04-22 PROCEDURE — 64721 CARPAL TUNNEL SURGERY: CPT | Performed by: ORTHOPAEDIC SURGERY

## 2021-04-22 PROCEDURE — 25010000003 LIDOCAINE 1 % SOLUTION: Performed by: ORTHOPAEDIC SURGERY

## 2021-04-22 PROCEDURE — 26055 INCISE FINGER TENDON SHEATH: CPT | Performed by: ORTHOPAEDIC SURGERY

## 2021-04-22 PROCEDURE — 25010000002 FENTANYL CITRATE (PF) 100 MCG/2ML SOLUTION: Performed by: NURSE ANESTHETIST, CERTIFIED REGISTERED

## 2021-04-22 PROCEDURE — 25010000002 KETOROLAC TROMETHAMINE PER 15 MG: Performed by: NURSE ANESTHETIST, CERTIFIED REGISTERED

## 2021-04-22 PROCEDURE — 25010000002 ONDANSETRON PER 1 MG: Performed by: NURSE ANESTHETIST, CERTIFIED REGISTERED

## 2021-04-22 PROCEDURE — 82962 GLUCOSE BLOOD TEST: CPT

## 2021-04-22 PROCEDURE — 25010000002 PHENYLEPHRINE PER 1 ML: Performed by: NURSE ANESTHETIST, CERTIFIED REGISTERED

## 2021-04-22 RX ORDER — MIDAZOLAM HYDROCHLORIDE 1 MG/ML
2 INJECTION INTRAMUSCULAR; INTRAVENOUS
Status: DISCONTINUED | OUTPATIENT
Start: 2021-04-22 | End: 2021-04-22 | Stop reason: HOSPADM

## 2021-04-22 RX ORDER — ONDANSETRON 2 MG/ML
INJECTION INTRAMUSCULAR; INTRAVENOUS AS NEEDED
Status: DISCONTINUED | OUTPATIENT
Start: 2021-04-22 | End: 2021-04-22 | Stop reason: SURG

## 2021-04-22 RX ORDER — BUPIVACAINE HYDROCHLORIDE 5 MG/ML
INJECTION, SOLUTION PERINEURAL AS NEEDED
Status: DISCONTINUED | OUTPATIENT
Start: 2021-04-22 | End: 2021-04-22 | Stop reason: HOSPADM

## 2021-04-22 RX ORDER — ONDANSETRON 2 MG/ML
4 INJECTION INTRAMUSCULAR; INTRAVENOUS AS NEEDED
Status: DISCONTINUED | OUTPATIENT
Start: 2021-04-22 | End: 2021-04-22 | Stop reason: HOSPADM

## 2021-04-22 RX ORDER — MIDAZOLAM HYDROCHLORIDE 1 MG/ML
1 INJECTION INTRAMUSCULAR; INTRAVENOUS
Status: DISCONTINUED | OUTPATIENT
Start: 2021-04-22 | End: 2021-04-22 | Stop reason: HOSPADM

## 2021-04-22 RX ORDER — SODIUM CHLORIDE 0.9 % (FLUSH) 0.9 %
10 SYRINGE (ML) INJECTION AS NEEDED
Status: DISCONTINUED | OUTPATIENT
Start: 2021-04-22 | End: 2021-04-22 | Stop reason: HOSPADM

## 2021-04-22 RX ORDER — SODIUM CHLORIDE, SODIUM LACTATE, POTASSIUM CHLORIDE, CALCIUM CHLORIDE 600; 310; 30; 20 MG/100ML; MG/100ML; MG/100ML; MG/100ML
INJECTION, SOLUTION INTRAVENOUS CONTINUOUS PRN
Status: DISCONTINUED | OUTPATIENT
Start: 2021-04-22 | End: 2021-04-22 | Stop reason: SURG

## 2021-04-22 RX ORDER — IPRATROPIUM BROMIDE AND ALBUTEROL SULFATE 2.5; .5 MG/3ML; MG/3ML
3 SOLUTION RESPIRATORY (INHALATION) ONCE AS NEEDED
Status: DISCONTINUED | OUTPATIENT
Start: 2021-04-22 | End: 2021-04-22 | Stop reason: HOSPADM

## 2021-04-22 RX ORDER — SODIUM CHLORIDE 0.9 % (FLUSH) 0.9 %
10 SYRINGE (ML) INJECTION EVERY 12 HOURS SCHEDULED
Status: DISCONTINUED | OUTPATIENT
Start: 2021-04-22 | End: 2021-04-22 | Stop reason: HOSPADM

## 2021-04-22 RX ORDER — MEPERIDINE HYDROCHLORIDE 25 MG/ML
12.5 INJECTION INTRAMUSCULAR; INTRAVENOUS; SUBCUTANEOUS
Status: DISCONTINUED | OUTPATIENT
Start: 2021-04-22 | End: 2021-04-22 | Stop reason: HOSPADM

## 2021-04-22 RX ORDER — MAGNESIUM HYDROXIDE 1200 MG/15ML
LIQUID ORAL AS NEEDED
Status: DISCONTINUED | OUTPATIENT
Start: 2021-04-22 | End: 2021-04-22 | Stop reason: HOSPADM

## 2021-04-22 RX ORDER — OXYCODONE HYDROCHLORIDE AND ACETAMINOPHEN 5; 325 MG/1; MG/1
1 TABLET ORAL EVERY 4 HOURS PRN
Qty: 16 TABLET | Refills: 0 | Status: SHIPPED | OUTPATIENT
Start: 2021-04-22 | End: 2021-05-13

## 2021-04-22 RX ORDER — FENTANYL CITRATE 50 UG/ML
50 INJECTION, SOLUTION INTRAMUSCULAR; INTRAVENOUS
Status: DISCONTINUED | OUTPATIENT
Start: 2021-04-22 | End: 2021-04-22 | Stop reason: HOSPADM

## 2021-04-22 RX ORDER — PROPOFOL 10 MG/ML
VIAL (ML) INTRAVENOUS CONTINUOUS PRN
Status: DISCONTINUED | OUTPATIENT
Start: 2021-04-22 | End: 2021-04-22 | Stop reason: SURG

## 2021-04-22 RX ORDER — FENTANYL CITRATE 50 UG/ML
INJECTION, SOLUTION INTRAMUSCULAR; INTRAVENOUS AS NEEDED
Status: DISCONTINUED | OUTPATIENT
Start: 2021-04-22 | End: 2021-04-22 | Stop reason: SURG

## 2021-04-22 RX ORDER — MIDAZOLAM HYDROCHLORIDE 1 MG/ML
INJECTION INTRAMUSCULAR; INTRAVENOUS AS NEEDED
Status: DISCONTINUED | OUTPATIENT
Start: 2021-04-22 | End: 2021-04-22 | Stop reason: SURG

## 2021-04-22 RX ORDER — FAMOTIDINE 10 MG/ML
INJECTION, SOLUTION INTRAVENOUS AS NEEDED
Status: DISCONTINUED | OUTPATIENT
Start: 2021-04-22 | End: 2021-04-22 | Stop reason: SURG

## 2021-04-22 RX ORDER — KETOROLAC TROMETHAMINE 30 MG/ML
INJECTION, SOLUTION INTRAMUSCULAR; INTRAVENOUS AS NEEDED
Status: DISCONTINUED | OUTPATIENT
Start: 2021-04-22 | End: 2021-04-22 | Stop reason: SURG

## 2021-04-22 RX ORDER — LIDOCAINE HYDROCHLORIDE 10 MG/ML
INJECTION, SOLUTION INFILTRATION; PERINEURAL AS NEEDED
Status: DISCONTINUED | OUTPATIENT
Start: 2021-04-22 | End: 2021-04-22 | Stop reason: HOSPADM

## 2021-04-22 RX ORDER — OXYCODONE HYDROCHLORIDE AND ACETAMINOPHEN 5; 325 MG/1; MG/1
1 TABLET ORAL ONCE AS NEEDED
Status: DISCONTINUED | OUTPATIENT
Start: 2021-04-22 | End: 2021-04-22 | Stop reason: HOSPADM

## 2021-04-22 RX ORDER — SODIUM CHLORIDE, SODIUM LACTATE, POTASSIUM CHLORIDE, CALCIUM CHLORIDE 600; 310; 30; 20 MG/100ML; MG/100ML; MG/100ML; MG/100ML
125 INJECTION, SOLUTION INTRAVENOUS ONCE
Status: COMPLETED | OUTPATIENT
Start: 2021-04-22 | End: 2021-04-22

## 2021-04-22 RX ADMIN — ONDANSETRON 4 MG: 2 INJECTION INTRAMUSCULAR; INTRAVENOUS at 07:28

## 2021-04-22 RX ADMIN — FAMOTIDINE 20 MG: 10 INJECTION INTRAVENOUS at 07:28

## 2021-04-22 RX ADMIN — PHENYLEPHRINE HYDROCHLORIDE 100 MCG: 10 INJECTION, SOLUTION INTRAMUSCULAR; INTRAVENOUS; SUBCUTANEOUS at 08:06

## 2021-04-22 RX ADMIN — KETOROLAC TROMETHAMINE 30 MG: 30 INJECTION, SOLUTION INTRAMUSCULAR at 08:38

## 2021-04-22 RX ADMIN — SODIUM CHLORIDE, POTASSIUM CHLORIDE, SODIUM LACTATE AND CALCIUM CHLORIDE 125 ML/HR: 600; 310; 30; 20 INJECTION, SOLUTION INTRAVENOUS at 07:22

## 2021-04-22 RX ADMIN — PHENYLEPHRINE HYDROCHLORIDE 100 MCG: 10 INJECTION, SOLUTION INTRAMUSCULAR; INTRAVENOUS; SUBCUTANEOUS at 07:55

## 2021-04-22 RX ADMIN — PROPOFOL 140 MCG/KG/MIN: 10 INJECTION, EMULSION INTRAVENOUS at 07:33

## 2021-04-22 RX ADMIN — FENTANYL CITRATE 100 MCG: 50 INJECTION INTRAMUSCULAR; INTRAVENOUS at 07:28

## 2021-04-22 RX ADMIN — MIDAZOLAM 2 MG: 1 INJECTION INTRAMUSCULAR; INTRAVENOUS at 07:28

## 2021-04-22 RX ADMIN — PHENYLEPHRINE HYDROCHLORIDE 100 MCG: 10 INJECTION, SOLUTION INTRAMUSCULAR; INTRAVENOUS; SUBCUTANEOUS at 08:25

## 2021-04-22 RX ADMIN — SODIUM CHLORIDE, POTASSIUM CHLORIDE, SODIUM LACTATE AND CALCIUM CHLORIDE: 600; 310; 30; 20 INJECTION, SOLUTION INTRAVENOUS at 07:28

## 2021-04-22 NOTE — ANESTHESIA PREPROCEDURE EVALUATION
Anesthesia Evaluation                  Airway   Mallampati: I  TM distance: >3 FB  Neck ROM: full  No difficulty expected  Dental - normal exam     Pulmonary - normal exam   Cardiovascular - normal exam    (+) hypertension, past MI , CAD, hyperlipidemia,       Neuro/Psych  (+) numbness,     GI/Hepatic/Renal/Endo    (+)   diabetes mellitus,     Musculoskeletal     Abdominal  - normal exam    Bowel sounds: normal.   Substance History      OB/GYN          Other                        Anesthesia Plan    ASA 2     general     intravenous induction       Plan discussed with CRNA.

## 2021-04-22 NOTE — OP NOTE
CARPAL TUNNEL RELEASE, FINGER TRIGGER RELEASE  Procedure Note    Kary Segundo  4/22/2021    Pre-op Diagnosis:   Carpal tunnel syndrome, right [G56.01]  Trigger finger, right middle finger [M65.331]  Trigger finger, right index finger [M65.321]    Post-op Diagnosis:     Post-Op Diagnosis Codes:     * Carpal tunnel syndrome, right [G56.01]     * Trigger finger, right middle finger [M65.331]     * Trigger finger, right index finger [M65.321]    Procedure(s):  RIGHT CARPAL TUNNEL RELEASE  TRIGGER FINGER RELEASE RIGHT MIDDLE AND INDEX    Surgeon(s):  Joesph Queen MD    Anesthesia: General    Operative technique: With patient in the operating theatre under general IV sedation with right hand and arm sterilely prepped and draped in the usual manner extremity was exsanguinated tourniquet inflated to 200 mmHg.  Palmar aspect of right hand was infiltrated with 5 cc of 0.5 Marcaine.  The palmar aspect hand in line with the index and middle fingers was infiltrated with 2 cc of 0.5 Marcaine.  Longitudinal incision was made from the distal wrist crease extending toward the fourth finger 5 cm in length with skin divided sharply palmar fascia was divided exposing underlying transverse carpal ligament which was divided from distal to proximal protecting underlying median nerve.  Second incision was made in line with the third finger directly over A1 pulley 1 cm in length carried through skin sharply the A1 pulley identified and released the finger taken through full excursion confirming complete relief and no triggering.  The index A1 pulley was then exposed through longitudinal incision and released taking index finger through full excursion.  Tourniquet was then deflated hemostasis was obtained in all 3 wounds all 3 wounds were then closed with 3-0 nylon vertical mattress suture with sterile dressing applied he was taken to recovery room in stable condition.    Staff:   Circulator: Angelica Lynn RN  Scrub  Person: Kae Hobson  Assistant: Mauricio Becerra    Estimated Blood Loss: none    Specimens:   none               Implants/Grafts: none      Drains: None    Complications: none    Tourniquet time:16   min    Joesph Queen MD     Date: 4/22/2021  Time: 08:47 EDT    Cc: Bridget Perdomo APRN

## 2021-04-22 NOTE — ANESTHESIA POSTPROCEDURE EVALUATION
Patient: Kary Segundo    Procedure Summary     Date: 04/22/21 Room / Location:  [facility] OR 03 /  [facility]    Anesthesia Start: 0730 Anesthesia Stop: 0838    Procedures:       RIGHT CARPAL TUNNEL RELEASE (Right Wrist)      TRIGGER FINGER RELEASE RIGHT MIDDLE AND INDEX (Right Fingers) Diagnosis:       Carpal tunnel syndrome, right      Trigger finger, right middle finger      Trigger finger, right index finger      (Carpal tunnel syndrome, right [G56.01])      (Trigger finger, right middle finger [M65.331])      (Trigger finger, right index finger [M65.321])    Surgeons: Joesph Queen MD Provider: Sarath Gotti DO    Anesthesia Type: general ASA Status: 2          Anesthesia Type: general    Vitals  Vitals Value Taken Time   /64 04/22/21 0903   Temp 98 °F (36.7 °C) 04/22/21 0839   Pulse 87 04/22/21 0903   Resp 14 04/22/21 0903   SpO2 98 % 04/22/21 0903           Post Anesthesia Care and Evaluation    Patient location during evaluation: PHASE II  Patient participation: complete - patient participated  Level of consciousness: lethargic  Pain score: 0  Pain management: adequate  Airway patency: patent  Anesthetic complications: No anesthetic complications    Cardiovascular status: acceptable  Respiratory status: acceptable  Hydration status: acceptable

## 2021-05-03 ENCOUNTER — OFFICE VISIT (OUTPATIENT)
Dept: ORTHOPEDIC SURGERY | Facility: CLINIC | Age: 56
End: 2021-05-03

## 2021-05-03 VITALS
BODY MASS INDEX: 28.28 KG/M2 | HEART RATE: 79 BPM | SYSTOLIC BLOOD PRESSURE: 121 MMHG | DIASTOLIC BLOOD PRESSURE: 67 MMHG | TEMPERATURE: 97.7 F | WEIGHT: 176 LBS | HEIGHT: 66 IN

## 2021-05-03 DIAGNOSIS — Z09 POSTOP CHECK: Primary | ICD-10-CM

## 2021-05-03 DIAGNOSIS — Z01.818 PREOPERATIVE TESTING: ICD-10-CM

## 2021-05-03 DIAGNOSIS — Z98.890 S/P CARPAL TUNNEL RELEASE: ICD-10-CM

## 2021-05-03 DIAGNOSIS — Z98.890 S/P TRIGGER FINGER RELEASE: ICD-10-CM

## 2021-05-03 DIAGNOSIS — M65.342 ACQUIRED TRIGGER FINGER OF LEFT RING FINGER: ICD-10-CM

## 2021-05-03 DIAGNOSIS — M65.332 ACQUIRED TRIGGER FINGER OF LEFT MIDDLE FINGER: ICD-10-CM

## 2021-05-03 PROCEDURE — 99214 OFFICE O/P EST MOD 30 MIN: CPT | Performed by: ORTHOPAEDIC SURGERY

## 2021-05-03 NOTE — PROGRESS NOTES
History and Physical      Patient: Kary Segundo  YOB: 1965  Date of Encounter: 05/03/2021      Chief Complaint:   Chief Complaint   Patient presents with   • Right Hand - Follow-up, Post-op     04/22/21 (11d)     Joesph Queen MD     Right Carpal Tunnel Release - Right   Trigger Finger Release Right Middle And Index - Right        • Right Wrist - Follow-up, Post-op, Pain, Edema   • Left Hand - Initial Evaluation, Pain           HPI:   Kray Segundo, 55 y.o. male, presents in follow-up carpal tunnel release right hand and release of right second and third trigger fingers.  Overall he is doing well his sensation has improved pain is decreased and he has better motion of his index and middle fingers.  He complains of similar problem with his left hand with locking of his third and fourth fingers.  He has pain with activity frequently in the morning he will have to unlock his fingers.  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  Currently on Plavix he discontinued just prior to his most recent procedure.  Has sustained MI in the past 2020.        Active Problem List:  Patient Active Problem List   Diagnosis   • Trigger finger, right index finger   • Trigger finger, right middle finger   • Carpal tunnel syndrome, right   • Acquired trigger finger of left middle finger   • Acquired trigger finger of left ring finger           Past Medical History:  Past Medical History:   Diagnosis Date   • AMI (acute myocardial infarction) (CMS/HCC)     2020   • Anemia    • Carpal tunnel syndrome    • Coronary artery disease    • Elevated cholesterol    • Hyperlipidemia    • Hypertension    • Staph infection     hx arm   • Trigger finger    • Type 2 diabetes mellitus (CMS/HCC)            Past Surgical History:  Past Surgical History:   Procedure Laterality Date   • CARPAL TUNNEL RELEASE Right 4/22/2021    Procedure: RIGHT CARPAL TUNNEL RELEASE;  Surgeon: Joesph Queen MD;  Location: [facility];  Service:  Orthopedics;  Laterality: Right;   • CIRCUMCISION     • COLONOSCOPY     • SHOULDER SURGERY Right     laceration repair   • TRIGGER FINGER RELEASE Right 4/22/2021    Procedure: TRIGGER FINGER RELEASE RIGHT MIDDLE AND INDEX;  Surgeon: Joesph Queen MD;  Location: [facility];  Service: Orthopedics;  Laterality: Right;   • UMBILICAL HERNIA REPAIR             Family History:  Family History   Problem Relation Age of Onset   • Diabetes Mother    • Osteoarthritis Mother    • Osteoporosis Mother    • Heart disease Mother    • Diabetes Father    • Hypertension Father    • Cancer Father    • Diabetes Sister    • Diabetes Brother            Social History:  Social History     Socioeconomic History   • Marital status:      Spouse name: Not on file   • Number of children: Not on file   • Years of education: Not on file   • Highest education level: Not on file   Tobacco Use   • Smoking status: Current Every Day Smoker     Packs/day: 1.00     Years: 15.00     Pack years: 15.00     Types: Cigarettes     Start date: 10/11/2006   • Smokeless tobacco: Never Used   Vaping Use   • Vaping Use: Some days   • Substances: Nicotine, Flavoring   • Devices: Disposable   Substance and Sexual Activity   • Alcohol use: No   • Drug use: No   • Sexual activity: Defer     Body mass index is 28.41 kg/m².        Medications:  Current Outpatient Medications   Medication Sig Dispense Refill   • benazepril (LOTENSIN) 20 MG tablet Take 1 tablet by mouth Daily. Do not take if BP <100/60     • clopidogrel (PLAVIX) 75 MG tablet Take 75 mg by mouth Daily. On hold per dr office     • Diclofenac Sodium (VOLTAREN) 1 % gel gel APPLY 2 GRAMS TO AFFECTED AREA FOUR TIMES A DAY AS NEEDED FOR PAIN     • fenofibrate (TRICOR) 145 MG tablet Take 145 mg by mouth Daily.     • gabapentin (NEURONTIN) 800 MG tablet Take 800 mg by mouth 3 (Three) Times a Day.     • metFORMIN (GLUCOPHAGE) 1000 MG tablet Take 1,000 mg by mouth 2 (Two) Times a Day With Meals.        • nitroglycerin (NITROSTAT) 0.4 MG SL tablet PLACE 1 TABLET UNDER TONGUE AND LET DISSOLVE, MAY REPEAT EVERY FIVE MINUTES FOR UP TO 3 DOSES IF CHEST PAIN DOES NOT CEASE GO TO THE E.R.     • oxyCODONE-acetaminophen (PERCOCET) 5-325 MG per tablet Take 1 tablet by mouth Every 4 (Four) Hours As Needed (Pain). 16 tablet 0     No current facility-administered medications for this visit.           Allergies:  No Known Allergies        Review of Systems:   Review of Systems   HENT: Negative.    Eyes: Negative.    Respiratory: Negative.    Cardiovascular: Negative.    Gastrointestinal: Negative.    Endocrine: Negative.    Genitourinary: Negative.    Musculoskeletal: Positive for arthralgias and back pain.   Skin: Negative.    Allergic/Immunologic: Negative.    Neurological: Positive for weakness and numbness.   Hematological: Negative.    Psychiatric/Behavioral: The patient is nervous/anxious.            Physical Exam:   Physical Exam  Vitals and nursing note reviewed.   Constitutional:       General: He is not in acute distress.     Appearance: He is not diaphoretic.   HENT:      Head: Normocephalic and atraumatic.      Right Ear: External ear normal.      Left Ear: External ear normal.   Eyes:      General:         Right eye: No discharge.         Left eye: No discharge.      Conjunctiva/sclera: Conjunctivae normal.   Cardiovascular:      Rate and Rhythm: Normal rate and regular rhythm.      Heart sounds: Normal heart sounds. No murmur heard.     Pulmonary:      Effort: Pulmonary effort is normal. No respiratory distress.      Breath sounds: Normal breath sounds. No wheezing.   Abdominal:      General: There is no distension.      Palpations: Abdomen is soft.      Tenderness: There is no guarding.   Musculoskeletal:      Cervical back: Normal range of motion and neck supple.   Skin:     General: Skin is warm and dry.      Capillary Refill: Capillary refill takes less than 2 seconds.   Neurological:      Mental Status: He  "is alert and oriented to person, place, and time.   Psychiatric:         Behavior: Behavior normal.         Thought Content: Thought content normal.         Judgment: Judgment normal.       GENERAL: 55 y.o. male, alert and oriented X 3 in no acute distress.   Visit Vitals  /67   Pulse 79   Temp 97.7 °F (36.5 °C)   Ht 167.6 cm (66\")   Wt 79.8 kg (176 lb)   BMI 28.41 kg/m²         Musculoskeletal:   Examination right hand reveals palmar incision intact without surrounding erythema.  Both distal incisions are also intact without surrounding erythema he has active motion of his index and middle fingers with minimal discomfort.    Left hand evaluation reveals gating of the right and fourth fingers with pain directly over the A1 pulley third and fourth fingers.        Assessment & Plan:   55 y.o. male presents follow-up doing well status post carpal tunnel release right hand with release of right second and third trigger finger is doing well today his sutures are removed he requests that we proceed with release of left hand third and fourth trigger fingers he is scheduled [facility] he will continue to hold his Plavix.      ICD-10-CM ICD-9-CM   1. Postop check  Z09 V67.00   2. S/P carpal tunnel release right  Z98.890 V45.89   3. S/P trigger finger release right  Z98.890 V45.89   4. Acquired trigger finger of left middle finger  M65.332 727.03   5. Acquired trigger finger of left ring finger  M65.342 727.03   6. Preoperative testing  Z01.818 V72.84           Cc:   Bridget Perdomo, APRN              This document has been electronically signed by Joesph Queen MD   May 4, 2021 12:36 EDT                "

## 2021-05-11 ENCOUNTER — LAB (OUTPATIENT)
Dept: LAB | Facility: HOSPITAL | Age: 56
End: 2021-05-11

## 2021-05-11 DIAGNOSIS — Z01.818 PREOPERATIVE TESTING: ICD-10-CM

## 2021-05-11 PROCEDURE — U0004 COV-19 TEST NON-CDC HGH THRU: HCPCS

## 2021-05-11 PROCEDURE — U0005 INFEC AGEN DETEC AMPLI PROBE: HCPCS

## 2021-05-11 PROCEDURE — C9803 HOPD COVID-19 SPEC COLLECT: HCPCS

## 2021-05-12 ENCOUNTER — TELEPHONE (OUTPATIENT)
Dept: ORTHOPEDIC SURGERY | Facility: CLINIC | Age: 56
End: 2021-05-12

## 2021-05-12 NOTE — TELEPHONE ENCOUNTER
Caller: JENNIFER LIRA    Relationship: WIFE    Best call back number: [phone]    What form or medical record are you requesting: SHORT TERM DISABILITY    Who is requesting this form or medical record from you: PT'S EMPLOYER    How would you like to receive the form or medical records (pick-up, mail, fax): FAX  If fax, what is the fax number: NUMBER IS ON PAPERWORK    Timeframe paperwork needed: ASAP TODAY    Additional notes: PT'S WIFE SAID THAT HE BROUGHT IN PAPERWORK FOR SHORT-TERM DISABILITY AT HIS LAST APPT ON 5/3 BUT HIS EMPLOYER SAID THEY NEVER RECEIVED IT, SO THEY FAXED OVER ANOTHER COPY OF THE PAPERWORK YESTERDAY 5/11 TO BE FILLED OUT. PT'S WIFE SAID THEY NEED THIS PAPERWORK DONE AS SOON AS POSSIBLE SO HE CAN GET HIS PAYCHECK . PT'S WIFE WOULD LIKE A CALL BACK WHEN THE PAPERWORK HAS BEEN FAXED OVER.

## 2021-05-13 ENCOUNTER — ANESTHESIA (OUTPATIENT)
Dept: PERIOP | Facility: HOSPITAL | Age: 56
End: 2021-05-13

## 2021-05-13 ENCOUNTER — TELEPHONE (OUTPATIENT)
Dept: ORTHOPEDIC SURGERY | Facility: CLINIC | Age: 56
End: 2021-05-13

## 2021-05-13 ENCOUNTER — ANESTHESIA EVENT (OUTPATIENT)
Dept: PERIOP | Facility: HOSPITAL | Age: 56
End: 2021-05-13

## 2021-05-13 ENCOUNTER — HOSPITAL ENCOUNTER (OUTPATIENT)
Facility: HOSPITAL | Age: 56
Setting detail: HOSPITAL OUTPATIENT SURGERY
Discharge: HOME | End: 2021-05-13
Attending: ORTHOPAEDIC SURGERY | Admitting: ORTHOPAEDIC SURGERY

## 2021-05-13 VITALS
OXYGEN SATURATION: 99 % | DIASTOLIC BLOOD PRESSURE: 82 MMHG | BODY MASS INDEX: 28.73 KG/M2 | WEIGHT: 178.8 LBS | TEMPERATURE: 98 F | HEIGHT: 66 IN | HEART RATE: 62 BPM | SYSTOLIC BLOOD PRESSURE: 119 MMHG | RESPIRATION RATE: 16 BRPM

## 2021-05-13 DIAGNOSIS — M65.332 ACQUIRED TRIGGER FINGER OF LEFT MIDDLE FINGER: ICD-10-CM

## 2021-05-13 DIAGNOSIS — M65.342 ACQUIRED TRIGGER FINGER OF LEFT RING FINGER: ICD-10-CM

## 2021-05-13 LAB — GLUCOSE BLDC GLUCOMTR-MCNC: 259 MG/DL (ref 70–130)

## 2021-05-13 PROCEDURE — 82962 GLUCOSE BLOOD TEST: CPT

## 2021-05-13 PROCEDURE — 25010000002 FENTANYL CITRATE (PF) 100 MCG/2ML SOLUTION: Performed by: NURSE ANESTHETIST, CERTIFIED REGISTERED

## 2021-05-13 PROCEDURE — 25010000002 ONDANSETRON PER 1 MG: Performed by: NURSE ANESTHETIST, CERTIFIED REGISTERED

## 2021-05-13 PROCEDURE — 26055 INCISE FINGER TENDON SHEATH: CPT | Performed by: ORTHOPAEDIC SURGERY

## 2021-05-13 PROCEDURE — 25010000002 MIDAZOLAM PER 1 MG: Performed by: NURSE ANESTHETIST, CERTIFIED REGISTERED

## 2021-05-13 PROCEDURE — 25010000002 PROPOFOL 10 MG/ML EMULSION: Performed by: NURSE ANESTHETIST, CERTIFIED REGISTERED

## 2021-05-13 PROCEDURE — 25010000002 KETOROLAC TROMETHAMINE PER 15 MG: Performed by: NURSE ANESTHETIST, CERTIFIED REGISTERED

## 2021-05-13 RX ORDER — PROPOFOL 10 MG/ML
VIAL (ML) INTRAVENOUS AS NEEDED
Status: DISCONTINUED | OUTPATIENT
Start: 2021-05-13 | End: 2021-05-13 | Stop reason: SURG

## 2021-05-13 RX ORDER — ONDANSETRON 2 MG/ML
4 INJECTION INTRAMUSCULAR; INTRAVENOUS AS NEEDED
Status: DISCONTINUED | OUTPATIENT
Start: 2021-05-13 | End: 2021-05-13 | Stop reason: HOSPADM

## 2021-05-13 RX ORDER — FENTANYL CITRATE 50 UG/ML
50 INJECTION, SOLUTION INTRAMUSCULAR; INTRAVENOUS
Status: DISCONTINUED | OUTPATIENT
Start: 2021-05-13 | End: 2021-05-13 | Stop reason: HOSPADM

## 2021-05-13 RX ORDER — SODIUM CHLORIDE, SODIUM LACTATE, POTASSIUM CHLORIDE, CALCIUM CHLORIDE 600; 310; 30; 20 MG/100ML; MG/100ML; MG/100ML; MG/100ML
100 INJECTION, SOLUTION INTRAVENOUS ONCE AS NEEDED
Status: DISCONTINUED | OUTPATIENT
Start: 2021-05-13 | End: 2021-05-13 | Stop reason: HOSPADM

## 2021-05-13 RX ORDER — MIDAZOLAM HYDROCHLORIDE 1 MG/ML
INJECTION INTRAMUSCULAR; INTRAVENOUS AS NEEDED
Status: DISCONTINUED | OUTPATIENT
Start: 2021-05-13 | End: 2021-05-13 | Stop reason: SURG

## 2021-05-13 RX ORDER — IPRATROPIUM BROMIDE AND ALBUTEROL SULFATE 2.5; .5 MG/3ML; MG/3ML
3 SOLUTION RESPIRATORY (INHALATION) ONCE AS NEEDED
Status: DISCONTINUED | OUTPATIENT
Start: 2021-05-13 | End: 2021-05-13 | Stop reason: HOSPADM

## 2021-05-13 RX ORDER — FAMOTIDINE 10 MG/ML
INJECTION, SOLUTION INTRAVENOUS AS NEEDED
Status: DISCONTINUED | OUTPATIENT
Start: 2021-05-13 | End: 2021-05-13 | Stop reason: SURG

## 2021-05-13 RX ORDER — SODIUM CHLORIDE, SODIUM LACTATE, POTASSIUM CHLORIDE, CALCIUM CHLORIDE 600; 310; 30; 20 MG/100ML; MG/100ML; MG/100ML; MG/100ML
INJECTION, SOLUTION INTRAVENOUS CONTINUOUS PRN
Status: DISCONTINUED | OUTPATIENT
Start: 2021-05-13 | End: 2021-05-13 | Stop reason: SURG

## 2021-05-13 RX ORDER — OXYCODONE HYDROCHLORIDE AND ACETAMINOPHEN 5; 325 MG/1; MG/1
1 TABLET ORAL EVERY 4 HOURS PRN
Qty: 8 TABLET | Refills: 0 | Status: SHIPPED | OUTPATIENT
Start: 2021-05-13

## 2021-05-13 RX ORDER — SODIUM CHLORIDE 0.9 % (FLUSH) 0.9 %
10 SYRINGE (ML) INJECTION EVERY 12 HOURS SCHEDULED
Status: DISCONTINUED | OUTPATIENT
Start: 2021-05-13 | End: 2021-05-13 | Stop reason: HOSPADM

## 2021-05-13 RX ORDER — DROPERIDOL 2.5 MG/ML
0.62 INJECTION, SOLUTION INTRAMUSCULAR; INTRAVENOUS ONCE AS NEEDED
Status: DISCONTINUED | OUTPATIENT
Start: 2021-05-13 | End: 2021-05-13 | Stop reason: HOSPADM

## 2021-05-13 RX ORDER — LIDOCAINE HYDROCHLORIDE 10 MG/ML
INJECTION, SOLUTION EPIDURAL; INFILTRATION; INTRACAUDAL; PERINEURAL AS NEEDED
Status: DISCONTINUED | OUTPATIENT
Start: 2021-05-13 | End: 2021-05-13 | Stop reason: HOSPADM

## 2021-05-13 RX ORDER — MIDAZOLAM HYDROCHLORIDE 1 MG/ML
1 INJECTION INTRAMUSCULAR; INTRAVENOUS
Status: DISCONTINUED | OUTPATIENT
Start: 2021-05-13 | End: 2021-05-13 | Stop reason: HOSPADM

## 2021-05-13 RX ORDER — KETOROLAC TROMETHAMINE 30 MG/ML
INJECTION, SOLUTION INTRAMUSCULAR; INTRAVENOUS AS NEEDED
Status: DISCONTINUED | OUTPATIENT
Start: 2021-05-13 | End: 2021-05-13 | Stop reason: SURG

## 2021-05-13 RX ORDER — SODIUM CHLORIDE, SODIUM LACTATE, POTASSIUM CHLORIDE, CALCIUM CHLORIDE 600; 310; 30; 20 MG/100ML; MG/100ML; MG/100ML; MG/100ML
125 INJECTION, SOLUTION INTRAVENOUS ONCE
Status: COMPLETED | OUTPATIENT
Start: 2021-05-13 | End: 2021-05-13

## 2021-05-13 RX ORDER — BUPIVACAINE HYDROCHLORIDE 5 MG/ML
INJECTION, SOLUTION PERINEURAL AS NEEDED
Status: DISCONTINUED | OUTPATIENT
Start: 2021-05-13 | End: 2021-05-13 | Stop reason: HOSPADM

## 2021-05-13 RX ORDER — SODIUM CHLORIDE 0.9 % (FLUSH) 0.9 %
10 SYRINGE (ML) INJECTION AS NEEDED
Status: DISCONTINUED | OUTPATIENT
Start: 2021-05-13 | End: 2021-05-13 | Stop reason: HOSPADM

## 2021-05-13 RX ORDER — FENTANYL CITRATE 50 UG/ML
INJECTION, SOLUTION INTRAMUSCULAR; INTRAVENOUS AS NEEDED
Status: DISCONTINUED | OUTPATIENT
Start: 2021-05-13 | End: 2021-05-13 | Stop reason: SURG

## 2021-05-13 RX ORDER — MAGNESIUM HYDROXIDE 1200 MG/15ML
LIQUID ORAL AS NEEDED
Status: DISCONTINUED | OUTPATIENT
Start: 2021-05-13 | End: 2021-05-13 | Stop reason: HOSPADM

## 2021-05-13 RX ORDER — MEPERIDINE HYDROCHLORIDE 25 MG/ML
12.5 INJECTION INTRAMUSCULAR; INTRAVENOUS; SUBCUTANEOUS
Status: DISCONTINUED | OUTPATIENT
Start: 2021-05-13 | End: 2021-05-13 | Stop reason: HOSPADM

## 2021-05-13 RX ORDER — OXYCODONE HYDROCHLORIDE AND ACETAMINOPHEN 5; 325 MG/1; MG/1
1 TABLET ORAL ONCE AS NEEDED
Status: DISCONTINUED | OUTPATIENT
Start: 2021-05-13 | End: 2021-05-13 | Stop reason: HOSPADM

## 2021-05-13 RX ORDER — ONDANSETRON 2 MG/ML
INJECTION INTRAMUSCULAR; INTRAVENOUS AS NEEDED
Status: DISCONTINUED | OUTPATIENT
Start: 2021-05-13 | End: 2021-05-13 | Stop reason: SURG

## 2021-05-13 RX ADMIN — FENTANYL CITRATE 100 MCG: 50 INJECTION INTRAMUSCULAR; INTRAVENOUS at 12:29

## 2021-05-13 RX ADMIN — SODIUM CHLORIDE, POTASSIUM CHLORIDE, SODIUM LACTATE AND CALCIUM CHLORIDE: 600; 310; 30; 20 INJECTION, SOLUTION INTRAVENOUS at 12:22

## 2021-05-13 RX ADMIN — FAMOTIDINE 20 MG: 10 INJECTION INTRAVENOUS at 12:22

## 2021-05-13 RX ADMIN — KETOROLAC TROMETHAMINE 30 MG: 30 INJECTION, SOLUTION INTRAMUSCULAR at 12:29

## 2021-05-13 RX ADMIN — PROPOFOL 20 MG: 10 INJECTION, EMULSION INTRAVENOUS at 12:34

## 2021-05-13 RX ADMIN — ONDANSETRON 4 MG: 2 INJECTION INTRAMUSCULAR; INTRAVENOUS at 12:22

## 2021-05-13 RX ADMIN — MIDAZOLAM 2 MG: 1 INJECTION INTRAMUSCULAR; INTRAVENOUS at 12:22

## 2021-05-13 RX ADMIN — PROPOFOL 30 MG: 10 INJECTION, EMULSION INTRAVENOUS at 12:33

## 2021-05-13 RX ADMIN — FENTANYL CITRATE 100 MCG: 50 INJECTION INTRAMUSCULAR; INTRAVENOUS at 12:22

## 2021-05-13 RX ADMIN — MIDAZOLAM 2 MG: 1 INJECTION INTRAMUSCULAR; INTRAVENOUS at 12:26

## 2021-05-13 RX ADMIN — SODIUM CHLORIDE, POTASSIUM CHLORIDE, SODIUM LACTATE AND CALCIUM CHLORIDE 125 ML/HR: 600; 310; 30; 20 INJECTION, SOLUTION INTRAVENOUS at 11:12

## 2021-05-13 NOTE — ANESTHESIA POSTPROCEDURE EVALUATION
Patient: Kary Segundo    Procedure Summary     Date: 05/13/21 Room / Location: [facility] OR 03 / [facility] OR    Anesthesia Start: 1222 Anesthesia Stop: 1254    Procedure: TRIGGER FINGER RELEASE LEFT MIDDLE AND RING FINGERS (Left Fingers) Diagnosis:       Acquired trigger finger of left middle finger      Acquired trigger finger of left ring finger      (Acquired trigger finger of left middle finger [M65.332])      (Acquired trigger finger of left ring finger [M65.342])    Surgeons: Joesph Queen MD Provider: Gregory Villalba MD    Anesthesia Type: general, MAC ASA Status: 2          Anesthesia Type: general, MAC    Vitals  Vitals Value Taken Time   /81 05/13/21 1310   Temp 98 °F (36.7 °C) 05/13/21 1255   Pulse 69 05/13/21 1310   Resp 16 05/13/21 1310   SpO2 97 % 05/13/21 1310           Post Anesthesia Care and Evaluation    Patient location during evaluation: bedside  Patient participation: complete - patient participated  Level of consciousness: awake and alert  Pain score: 1  Pain management: adequate  Airway patency: patent  Anesthetic complications: No anesthetic complications  PONV Status: none  Cardiovascular status: acceptable  Respiratory status: acceptable  Hydration status: acceptable

## 2021-05-13 NOTE — ANESTHESIA PREPROCEDURE EVALUATION
Anesthesia Evaluation     NPO Solid Status: > 8 hours  NPO Liquid Status: > 8 hours           Airway   Mallampati: I  TM distance: >3 FB  Neck ROM: full  No difficulty expected  Dental - normal exam     Pulmonary - normal exam   Cardiovascular - normal exam    (+) hypertension, past MI , CAD, hyperlipidemia,       Neuro/Psych  (+) numbness,     GI/Hepatic/Renal/Endo    (+)   diabetes mellitus,     Musculoskeletal     Abdominal  - normal exam    Bowel sounds: normal.   Substance History      OB/GYN          Other                          Anesthesia Plan    ASA 2     general and MAC     intravenous induction       Plan discussed with CRNA.

## 2021-05-13 NOTE — TELEPHONE ENCOUNTER
Patient called wanting to know why he did not have the left Carpal Tunnel Release 05/13/2021 along with the trigger finger release       Procedure:05/13/2021  TRIGGER FINGER RELEASE LEFT MIDDLE AND RING FINGERS     Surgeon:  Joesph Queen MD

## 2021-05-14 ENCOUNTER — HOSPITAL ENCOUNTER (EMERGENCY)
Facility: HOSPITAL | Age: 56
Discharge: ANOTHER HOSPITAL | End: 2021-05-14
Attending: EMERGENCY MEDICINE | Admitting: EMERGENCY MEDICINE

## 2021-05-14 ENCOUNTER — APPOINTMENT (OUTPATIENT)
Dept: GENERAL RADIOLOGY | Facility: HOSPITAL | Age: 56
End: 2021-05-14

## 2021-05-14 VITALS
OXYGEN SATURATION: 100 % | HEIGHT: 66 IN | SYSTOLIC BLOOD PRESSURE: 102 MMHG | HEART RATE: 90 BPM | RESPIRATION RATE: 18 BRPM | WEIGHT: 173 LBS | DIASTOLIC BLOOD PRESSURE: 66 MMHG | TEMPERATURE: 98 F | BODY MASS INDEX: 27.8 KG/M2

## 2021-05-14 DIAGNOSIS — I48.91 NEW ONSET ATRIAL FIBRILLATION (HCC): ICD-10-CM

## 2021-05-14 DIAGNOSIS — R07.9 CHEST PAIN, UNSPECIFIED TYPE: Primary | ICD-10-CM

## 2021-05-14 LAB
ALBUMIN SERPL-MCNC: 4.02 G/DL (ref 3.5–5.2)
ALBUMIN/GLOB SERPL: 1.5 G/DL
ALP SERPL-CCNC: 90 U/L (ref 39–117)
ALT SERPL W P-5'-P-CCNC: 10 U/L (ref 1–41)
ANION GAP SERPL CALCULATED.3IONS-SCNC: 12.2 MMOL/L (ref 5–15)
APTT PPP: 34.5 SECONDS (ref 25.6–35.3)
AST SERPL-CCNC: 13 U/L (ref 1–40)
BASOPHILS # BLD AUTO: 0.02 10*3/MM3 (ref 0–0.2)
BASOPHILS NFR BLD AUTO: 0.2 % (ref 0–1.5)
BILIRUB SERPL-MCNC: 0.3 MG/DL (ref 0–1.2)
BUN SERPL-MCNC: 13 MG/DL (ref 6–20)
BUN/CREAT SERPL: 11.1 (ref 7–25)
CALCIUM SPEC-SCNC: 9.4 MG/DL (ref 8.6–10.5)
CHLORIDE SERPL-SCNC: 99 MMOL/L (ref 98–107)
CO2 SERPL-SCNC: 21.8 MMOL/L (ref 22–29)
CREAT SERPL-MCNC: 1.17 MG/DL (ref 0.76–1.27)
CRP SERPL-MCNC: 0.42 MG/DL (ref 0–0.5)
D DIMER PPP FEU-MCNC: 0.35 MCGFEU/ML (ref 0–0.5)
D-LACTATE SERPL-SCNC: 2.5 MMOL/L (ref 0.5–2)
DEPRECATED RDW RBC AUTO: 41.2 FL (ref 37–54)
EOSINOPHIL # BLD AUTO: 0.12 10*3/MM3 (ref 0–0.4)
EOSINOPHIL NFR BLD AUTO: 1.1 % (ref 0.3–6.2)
ERYTHROCYTE [DISTWIDTH] IN BLOOD BY AUTOMATED COUNT: 12.4 % (ref 12.3–15.4)
GFR SERPL CREATININE-BSD FRML MDRD: 65 ML/MIN/1.73
GLOBULIN UR ELPH-MCNC: 2.7 GM/DL
GLUCOSE SERPL-MCNC: 441 MG/DL (ref 65–99)
HCT VFR BLD AUTO: 44.1 % (ref 37.5–51)
HGB BLD-MCNC: 14.5 G/DL (ref 13–17.7)
HOLD SPECIMEN: NORMAL
HOLD SPECIMEN: NORMAL
IMM GRANULOCYTES # BLD AUTO: 0.06 10*3/MM3 (ref 0–0.05)
IMM GRANULOCYTES NFR BLD AUTO: 0.5 % (ref 0–0.5)
INR PPP: 1.01 (ref 0.9–1.1)
LYMPHOCYTES # BLD AUTO: 3.16 10*3/MM3 (ref 0.7–3.1)
LYMPHOCYTES NFR BLD AUTO: 28.7 % (ref 19.6–45.3)
MAGNESIUM SERPL-MCNC: 1.7 MG/DL (ref 1.6–2.6)
MCH RBC QN AUTO: 30 PG (ref 26.6–33)
MCHC RBC AUTO-ENTMCNC: 32.9 G/DL (ref 31.5–35.7)
MCV RBC AUTO: 91.3 FL (ref 79–97)
MONOCYTES # BLD AUTO: 0.76 10*3/MM3 (ref 0.1–0.9)
MONOCYTES NFR BLD AUTO: 6.9 % (ref 5–12)
NEUTROPHILS NFR BLD AUTO: 6.89 10*3/MM3 (ref 1.7–7)
NEUTROPHILS NFR BLD AUTO: 62.6 % (ref 42.7–76)
NRBC BLD AUTO-RTO: 0 /100 WBC (ref 0–0.2)
NT-PROBNP SERPL-MCNC: 77.9 PG/ML (ref 0–900)
PLATELET # BLD AUTO: 218 10*3/MM3 (ref 140–450)
PMV BLD AUTO: 10.6 FL (ref 6–12)
POTASSIUM SERPL-SCNC: 4.8 MMOL/L (ref 3.5–5.2)
PROT SERPL-MCNC: 6.7 G/DL (ref 6–8.5)
PROTHROMBIN TIME: 13.1 SECONDS (ref 11.9–14.1)
QT INTERVAL: 278 MS
QTC INTERVAL: 381 MS
RBC # BLD AUTO: 4.83 10*6/MM3 (ref 4.14–5.8)
SODIUM SERPL-SCNC: 133 MMOL/L (ref 136–145)
T4 FREE SERPL-MCNC: 0.95 NG/DL (ref 0.93–1.7)
TROPONIN T SERPL-MCNC: <0.01 NG/ML (ref 0–0.03)
TROPONIN T SERPL-MCNC: <0.01 NG/ML (ref 0–0.03)
TSH SERPL DL<=0.05 MIU/L-ACNC: 1.51 UIU/ML (ref 0.27–4.2)
WBC # BLD AUTO: 11.01 10*3/MM3 (ref 3.4–10.8)
WHOLE BLOOD HOLD SPECIMEN: NORMAL
WHOLE BLOOD HOLD SPECIMEN: NORMAL

## 2021-05-14 PROCEDURE — 84443 ASSAY THYROID STIM HORMONE: CPT | Performed by: EMERGENCY MEDICINE

## 2021-05-14 PROCEDURE — 84439 ASSAY OF FREE THYROXINE: CPT | Performed by: EMERGENCY MEDICINE

## 2021-05-14 PROCEDURE — 71045 X-RAY EXAM CHEST 1 VIEW: CPT

## 2021-05-14 PROCEDURE — 85379 FIBRIN DEGRADATION QUANT: CPT | Performed by: EMERGENCY MEDICINE

## 2021-05-14 PROCEDURE — 83880 ASSAY OF NATRIURETIC PEPTIDE: CPT | Performed by: EMERGENCY MEDICINE

## 2021-05-14 PROCEDURE — 83605 ASSAY OF LACTIC ACID: CPT | Performed by: EMERGENCY MEDICINE

## 2021-05-14 PROCEDURE — 83735 ASSAY OF MAGNESIUM: CPT | Performed by: EMERGENCY MEDICINE

## 2021-05-14 PROCEDURE — 85025 COMPLETE CBC W/AUTO DIFF WBC: CPT | Performed by: EMERGENCY MEDICINE

## 2021-05-14 PROCEDURE — 85610 PROTHROMBIN TIME: CPT | Performed by: EMERGENCY MEDICINE

## 2021-05-14 PROCEDURE — 96375 TX/PRO/DX INJ NEW DRUG ADDON: CPT

## 2021-05-14 PROCEDURE — 96374 THER/PROPH/DIAG INJ IV PUSH: CPT

## 2021-05-14 PROCEDURE — 93005 ELECTROCARDIOGRAM TRACING: CPT | Performed by: FAMILY MEDICINE

## 2021-05-14 PROCEDURE — 85730 THROMBOPLASTIN TIME PARTIAL: CPT | Performed by: EMERGENCY MEDICINE

## 2021-05-14 PROCEDURE — 36415 COLL VENOUS BLD VENIPUNCTURE: CPT

## 2021-05-14 PROCEDURE — 99285 EMERGENCY DEPT VISIT HI MDM: CPT

## 2021-05-14 PROCEDURE — 93010 ELECTROCARDIOGRAM REPORT: CPT | Performed by: INTERNAL MEDICINE

## 2021-05-14 PROCEDURE — 86140 C-REACTIVE PROTEIN: CPT | Performed by: EMERGENCY MEDICINE

## 2021-05-14 PROCEDURE — 25010000002 HYDROMORPHONE 1 MG/ML SOLUTION: Performed by: EMERGENCY MEDICINE

## 2021-05-14 PROCEDURE — 87040 BLOOD CULTURE FOR BACTERIA: CPT | Performed by: EMERGENCY MEDICINE

## 2021-05-14 PROCEDURE — 25010000002 ONDANSETRON PER 1 MG: Performed by: EMERGENCY MEDICINE

## 2021-05-14 PROCEDURE — 84484 ASSAY OF TROPONIN QUANT: CPT | Performed by: EMERGENCY MEDICINE

## 2021-05-14 PROCEDURE — 80053 COMPREHEN METABOLIC PANEL: CPT | Performed by: EMERGENCY MEDICINE

## 2021-05-14 PROCEDURE — 96361 HYDRATE IV INFUSION ADD-ON: CPT

## 2021-05-14 PROCEDURE — 63710000001 INSULIN REGULAR HUMAN PER 5 UNITS: Performed by: EMERGENCY MEDICINE

## 2021-05-14 RX ORDER — HYDROMORPHONE HYDROCHLORIDE 1 MG/ML
0.5 INJECTION, SOLUTION INTRAMUSCULAR; INTRAVENOUS; SUBCUTANEOUS ONCE
Status: COMPLETED | OUTPATIENT
Start: 2021-05-14 | End: 2021-05-14

## 2021-05-14 RX ORDER — ASPIRIN 81 MG/1
324 TABLET, CHEWABLE ORAL ONCE
Status: COMPLETED | OUTPATIENT
Start: 2021-05-14 | End: 2021-05-14

## 2021-05-14 RX ORDER — ONDANSETRON 2 MG/ML
4 INJECTION INTRAMUSCULAR; INTRAVENOUS ONCE
Status: COMPLETED | OUTPATIENT
Start: 2021-05-14 | End: 2021-05-14

## 2021-05-14 RX ORDER — SODIUM CHLORIDE 9 MG/ML
125 INJECTION, SOLUTION INTRAVENOUS CONTINUOUS
Status: DISCONTINUED | OUTPATIENT
Start: 2021-05-14 | End: 2021-05-14 | Stop reason: HOSPADM

## 2021-05-14 RX ADMIN — ASPIRIN 324 MG: 81 TABLET, CHEWABLE ORAL at 00:55

## 2021-05-14 RX ADMIN — SODIUM CHLORIDE 1000 ML: 9 INJECTION, SOLUTION INTRAVENOUS at 02:56

## 2021-05-14 RX ADMIN — HUMAN INSULIN 5 UNITS: 100 INJECTION, SOLUTION SUBCUTANEOUS at 02:55

## 2021-05-14 RX ADMIN — HYDROMORPHONE HYDROCHLORIDE 0.5 MG: 1 INJECTION, SOLUTION INTRAMUSCULAR; INTRAVENOUS; SUBCUTANEOUS at 00:55

## 2021-05-14 RX ADMIN — SODIUM CHLORIDE 1000 ML: 9 INJECTION, SOLUTION INTRAVENOUS at 00:54

## 2021-05-14 RX ADMIN — SODIUM CHLORIDE 125 ML/HR: 9 INJECTION, SOLUTION INTRAVENOUS at 00:55

## 2021-05-14 RX ADMIN — ONDANSETRON 4 MG: 2 INJECTION INTRAMUSCULAR; INTRAVENOUS at 00:55

## 2021-05-19 LAB
BACTERIA SPEC AEROBE CULT: NORMAL
BACTERIA SPEC AEROBE CULT: NORMAL

## 2021-05-25 ENCOUNTER — TELEPHONE (OUTPATIENT)
Dept: ORTHOPEDIC SURGERY | Facility: CLINIC | Age: 56
End: 2021-05-25

## 2021-05-26 ENCOUNTER — OFFICE VISIT (OUTPATIENT)
Dept: ORTHOPEDIC SURGERY | Facility: CLINIC | Age: 56
End: 2021-05-26

## 2021-05-26 VITALS — WEIGHT: 173.06 LBS | BODY MASS INDEX: 27.81 KG/M2 | HEIGHT: 66 IN | TEMPERATURE: 97.5 F

## 2021-05-26 DIAGNOSIS — Z09 POSTOP CHECK: Primary | ICD-10-CM

## 2021-05-26 PROCEDURE — 99024 POSTOP FOLLOW-UP VISIT: CPT | Performed by: ORTHOPAEDIC SURGERY

## 2021-05-26 NOTE — PROGRESS NOTES
Patient: Kary Segundo  YOB: 1965  Date of Encounter: 05/26/2021      Chief Complaint:   Chief Complaint   Patient presents with   • Left Hand - Postop (global period)     05/13/21 (13d)     Joesph Queen MD    Trigger Finger Release Left Middle And Ring Fingers - Left                HPI:  Kary Segundo, 55 y.o. male returns in postoperative follow-up release left third and fourth trigger fingers he is now 13 days postop.      Medical History:  Patient Active Problem List   Diagnosis   • Trigger finger, right index finger   • Trigger finger, right middle finger   • Carpal tunnel syndrome, right     Past Medical History:   Diagnosis Date   • AMI (acute myocardial infarction) (CMS/[facility])     2020   • Anemia    • Carpal tunnel syndrome    • Coronary artery disease    • Elevated cholesterol    • Hyperlipidemia    • Hypertension    • Staph infection     hx arm   • Trigger finger    • Type 2 diabetes mellitus (CMS/[facility])          Surgical History:  Past Surgical History:   Procedure Laterality Date   • CARPAL TUNNEL RELEASE Right 4/22/2021    Procedure: RIGHT CARPAL TUNNEL RELEASE;  Surgeon: Joesph Queen MD;  Location: [facility];  Service: Orthopedics;  Laterality: Right;   • CIRCUMCISION     • COLONOSCOPY     • SHOULDER SURGERY Right     laceration repair   • TRIGGER FINGER RELEASE Right 4/22/2021    Procedure: TRIGGER FINGER RELEASE RIGHT MIDDLE AND INDEX;  Surgeon: Joesph Queen MD;  Location: [facility];  Service: Orthopedics;  Laterality: Right;   • TRIGGER FINGER RELEASE Left 5/13/2021    Procedure: TRIGGER FINGER RELEASE LEFT MIDDLE AND RING FINGERS;  Surgeon: Joesph Queen MD;  Location: [facility];  Service: Orthopedics;  Laterality: Left;   • UMBILICAL HERNIA REPAIR           Examination:  Examination left hand reveals both incisions from third fourth trigger finger releases to be intact without erythema.        Assessment & Plan:  55 y.o. male  presents follow-up release left third fourth trigger finger is doing well.  He wishes to return to work and we have provided completed paperwork for light duty restrictions with no lifting greater than 20 pounds he is released to return to work May 29, 2021.  He will follow-up in the future as needed.       Diagnosis Plan   1. Postop check             Cc:  Bridget Perdomo, KVNG              This document has been electronically signed by Joesph Queen MD   May 29, 2021 12:15 EDT

## 2021-06-01 ENCOUNTER — TELEPHONE (OUTPATIENT)
Dept: ORTHOPEDIC SURGERY | Facility: CLINIC | Age: 56
End: 2021-06-01

## 2021-06-01 NOTE — TELEPHONE ENCOUNTER
Caller: JOSE SORAYA    Relationship: SPOUSE    Best call back number: [phone]    What form or medical record are you requesting: WORK PAPERWORK    Who is requesting this form or medical record from you: [employer]    How would you like to receive the form or medical records (pick-up, mail, fax): FAX  If fax, what is the fax number: [phone]    Timeframe paperwork needed: ASAP    Additional notes: PT NEEDS WORK PAPERWORK FAXED TO [employer] STATING PT CAN RETURN TO WORK WITH NO RESTRICTIONS , ON LETTERHEAD WITH THE CASE#[id number] AND DISABILITY#[id number] AND ID#[id number]

## 2021-09-08 ENCOUNTER — HOSPITAL ENCOUNTER (OUTPATIENT)
Dept: GENERAL RADIOLOGY | Facility: HOSPITAL | Age: 56
Discharge: HOME | End: 2021-09-08
Admitting: NURSE PRACTITIONER

## 2021-09-08 ENCOUNTER — TRANSCRIBE ORDERS (OUTPATIENT)
Dept: ADMINISTRATIVE | Facility: HOSPITAL | Age: 56
End: 2021-09-08

## 2021-09-08 DIAGNOSIS — M54.50 LOW BACK PAIN, UNSPECIFIED BACK PAIN LATERALITY, UNSPECIFIED CHRONICITY, UNSPECIFIED WHETHER SCIATICA PRESENT: Primary | ICD-10-CM

## 2021-09-08 DIAGNOSIS — M54.50 LOW BACK PAIN, UNSPECIFIED BACK PAIN LATERALITY, UNSPECIFIED CHRONICITY, UNSPECIFIED WHETHER SCIATICA PRESENT: ICD-10-CM

## 2021-09-08 PROCEDURE — 72110 X-RAY EXAM L-2 SPINE 4/>VWS: CPT

## 2021-09-08 PROCEDURE — 72110 X-RAY EXAM L-2 SPINE 4/>VWS: CPT | Performed by: RADIOLOGY

## 2021-12-03 ENCOUNTER — HOSPITAL ENCOUNTER (EMERGENCY)
Facility: HOSPITAL | Age: 56
Discharge: LEFT BEFORE DISCHARGE | End: 2021-12-03
Attending: EMERGENCY MEDICINE | Admitting: EMERGENCY MEDICINE

## 2021-12-03 ENCOUNTER — HOSPITAL ENCOUNTER (OUTPATIENT)
Dept: INFUSION THERAPY | Facility: HOSPITAL | Age: 56
Discharge: HOME | End: 2021-12-03
Admitting: NURSE PRACTITIONER

## 2021-12-03 VITALS
DIASTOLIC BLOOD PRESSURE: 72 MMHG | WEIGHT: 183 LBS | HEIGHT: 66 IN | OXYGEN SATURATION: 99 % | TEMPERATURE: 97.8 F | SYSTOLIC BLOOD PRESSURE: 117 MMHG | HEART RATE: 67 BPM | RESPIRATION RATE: 16 BRPM | BODY MASS INDEX: 29.41 KG/M2

## 2021-12-03 VITALS
RESPIRATION RATE: 18 BRPM | OXYGEN SATURATION: 98 % | SYSTOLIC BLOOD PRESSURE: 133 MMHG | DIASTOLIC BLOOD PRESSURE: 64 MMHG | TEMPERATURE: 97.2 F | HEART RATE: 64 BPM

## 2021-12-03 DIAGNOSIS — Z20.822 EXPOSURE TO CONFIRMED CASE OF COVID-19: Primary | ICD-10-CM

## 2021-12-03 DIAGNOSIS — U07.1 COVID-19: Primary | ICD-10-CM

## 2021-12-03 LAB
FLUAV RNA RESP QL NAA+PROBE: NOT DETECTED
FLUBV RNA RESP QL NAA+PROBE: NOT DETECTED
SARS-COV-2 RNA RESP QL NAA+PROBE: DETECTED

## 2021-12-03 PROCEDURE — C9803 HOPD COVID-19 SPEC COLLECT: HCPCS

## 2021-12-03 PROCEDURE — 87636 SARSCOV2 & INF A&B AMP PRB: CPT | Performed by: PHYSICIAN ASSISTANT

## 2021-12-03 PROCEDURE — 99282 EMERGENCY DEPT VISIT SF MDM: CPT

## 2021-12-03 PROCEDURE — 25010000002 INJECTION, CASIRIVIMAB AND IMDEVIMAB, 1200 MG: Performed by: NURSE PRACTITIONER

## 2021-12-03 PROCEDURE — M0243 CASIRIVI AND IMDEVI INFUSION: HCPCS | Performed by: NURSE PRACTITIONER

## 2021-12-03 RX ORDER — METHYLPREDNISOLONE SODIUM SUCCINATE 125 MG/2ML
125 INJECTION, POWDER, LYOPHILIZED, FOR SOLUTION INTRAMUSCULAR; INTRAVENOUS AS NEEDED
Status: CANCELLED | OUTPATIENT
Start: 2021-12-03

## 2021-12-03 RX ORDER — EPINEPHRINE 1 MG/ML
0.3 INJECTION, SOLUTION INTRAMUSCULAR; SUBCUTANEOUS AS NEEDED
Status: DISCONTINUED | OUTPATIENT
Start: 2021-12-03 | End: 2021-12-05 | Stop reason: HOSPADM

## 2021-12-03 RX ORDER — METHYLPREDNISOLONE SODIUM SUCCINATE 125 MG/2ML
125 INJECTION, POWDER, LYOPHILIZED, FOR SOLUTION INTRAMUSCULAR; INTRAVENOUS AS NEEDED
Status: DISCONTINUED | OUTPATIENT
Start: 2021-12-03 | End: 2021-12-05 | Stop reason: HOSPADM

## 2021-12-03 RX ORDER — DIPHENHYDRAMINE HCL 50 MG
50 CAPSULE ORAL ONCE AS NEEDED
Status: DISCONTINUED | OUTPATIENT
Start: 2021-12-03 | End: 2021-12-05 | Stop reason: HOSPADM

## 2021-12-03 RX ORDER — DIPHENHYDRAMINE HCL 50 MG
50 CAPSULE ORAL ONCE AS NEEDED
Status: CANCELLED | OUTPATIENT
Start: 2021-12-03

## 2021-12-03 RX ORDER — DIPHENHYDRAMINE HYDROCHLORIDE 50 MG/ML
50 INJECTION INTRAMUSCULAR; INTRAVENOUS ONCE AS NEEDED
Status: CANCELLED | OUTPATIENT
Start: 2021-12-03

## 2021-12-03 RX ORDER — EPINEPHRINE 1 MG/ML
0.3 INJECTION, SOLUTION INTRAMUSCULAR; SUBCUTANEOUS AS NEEDED
Status: CANCELLED | OUTPATIENT
Start: 2021-12-03

## 2021-12-03 RX ORDER — DIPHENHYDRAMINE HYDROCHLORIDE 50 MG/ML
50 INJECTION INTRAMUSCULAR; INTRAVENOUS ONCE AS NEEDED
Status: DISCONTINUED | OUTPATIENT
Start: 2021-12-03 | End: 2021-12-05 | Stop reason: HOSPADM

## 2021-12-03 RX ADMIN — CASIRIVIMAB AND IMDEVIMAB: 600; 600 INJECTION, SOLUTION, CONCENTRATE INTRAVENOUS at 15:05

## 2021-12-03 NOTE — ED NOTES
"Upon swabbing patient nose, patient grabbed swabbed and pulled it from his nose. Patient agitated and cussed staff and states \"that is too far\". Educated patient on proper technique to obtain COVID swab. He then states \"noone else does it that way\". Educated patient on possible false negatives without using proper technique.      Jessica Haider RN  12/03/21 1020    "

## 2021-12-03 NOTE — ED PROVIDER NOTES
Subjective     History provided by:  Patient   used: No    URI  Presenting symptoms: congestion, cough and rhinorrhea    Severity:  Mild  Onset quality:  Sudden  Duration:  2 days  Timing:  Constant  Progression:  Worsening  Chronicity:  New  Relieved by:  Nothing  Worsened by:  Nothing  Ineffective treatments:  None tried  Risk factors: sick contacts    Risk factors comment:  Everyone in the home has covid. Wife got the infusion yesterday.       Review of Systems   Constitutional: Negative.    HENT: Positive for congestion and rhinorrhea.    Eyes: Negative.    Respiratory: Positive for cough.    Cardiovascular: Negative.    Gastrointestinal: Negative.    Endocrine: Negative.    Genitourinary: Negative.    Musculoskeletal: Negative.    Skin: Negative.    Allergic/Immunologic: Negative.    Neurological: Negative.    Hematological: Negative.    Psychiatric/Behavioral: Negative.    All other systems reviewed and are negative.      Past Medical History:   Diagnosis Date   • AMI (acute myocardial infarction) (CMS/[facility])     2020   • Anemia    • Carpal tunnel syndrome    • Coronary artery disease    • Elevated cholesterol    • Hyperlipidemia    • Hypertension    • Staph infection     hx arm   • Trigger finger    • Type 2 diabetes mellitus (CMS/[facility])        No Known Allergies    Past Surgical History:   Procedure Laterality Date   • CARPAL TUNNEL RELEASE Right 4/22/2021    Procedure: RIGHT CARPAL TUNNEL RELEASE;  Surgeon: Joesph Queen MD;  Location: [facility];  Service: Orthopedics;  Laterality: Right;   • CIRCUMCISION     • COLONOSCOPY     • SHOULDER SURGERY Right     laceration repair   • TRIGGER FINGER RELEASE Right 4/22/2021    Procedure: TRIGGER FINGER RELEASE RIGHT MIDDLE AND INDEX;  Surgeon: Joesph Queen MD;  Location: [facility];  Service: Orthopedics;  Laterality: Right;   • TRIGGER FINGER RELEASE Left 5/13/2021    Procedure: TRIGGER FINGER RELEASE LEFT MIDDLE AND RING  FINGERS;  Surgeon: Joesph Queen MD;  Location: [facility];  Service: Orthopedics;  Laterality: Left;   • UMBILICAL HERNIA REPAIR         Family History   Problem Relation Age of Onset   • Diabetes Mother    • Osteoarthritis Mother    • Osteoporosis Mother    • Heart disease Mother    • Diabetes Father    • Hypertension Father    • Cancer Father    • Diabetes Sister    • Diabetes Brother        Social History     Socioeconomic History   • Marital status:    Tobacco Use   • Smoking status: Current Every Day Smoker     Packs/day: 1.00     Years: 15.00     Pack years: 15.00     Types: Cigarettes     Start date: 10/11/2006   • Smokeless tobacco: Never Used   Vaping Use   • Vaping Use: Some days   • Substances: Nicotine, Flavoring   • Devices: Disposable   Substance and Sexual Activity   • Alcohol use: No   • Drug use: No   • Sexual activity: Defer           Objective   Physical Exam  Vitals and nursing note reviewed.   Constitutional:       Appearance: Normal appearance.   HENT:      Head: Normocephalic and atraumatic.      Right Ear: External ear normal.      Left Ear: External ear normal.      Nose: Congestion and rhinorrhea present.      Mouth/Throat:      Mouth: Mucous membranes are moist.      Pharynx: Oropharynx is clear.   Eyes:      Extraocular Movements: Extraocular movements intact.      Conjunctiva/sclera: Conjunctivae normal.      Pupils: Pupils are equal, round, and reactive to light.   Cardiovascular:      Rate and Rhythm: Normal rate and regular rhythm.      Pulses: Normal pulses.      Heart sounds: Normal heart sounds.   Pulmonary:      Effort: Pulmonary effort is normal.      Breath sounds: Normal breath sounds.   Abdominal:      General: Abdomen is flat. Bowel sounds are normal.      Palpations: Abdomen is soft.   Musculoskeletal:         General: Normal range of motion.      Cervical back: Normal range of motion and neck supple.   Skin:     General: Skin is warm.      Capillary Refill:  Capillary refill takes less than 2 seconds.   Neurological:      General: No focal deficit present.      Mental Status: He is alert and oriented to person, place, and time. Mental status is at baseline.   Psychiatric:         Mood and Affect: Mood normal.         Behavior: Behavior normal.         Thought Content: Thought content normal.         Judgment: Judgment normal.         Procedures           ED Course  ED Course as of 12/03/21 1021   Fri Dec 03, 2021   1014 Pt stormed out of the room and stated he was leaving. He refused to sign AMA papers.  [ML]      ED Course User Index  [ML] Isis Kaur PA                                                 MDM  Number of Diagnoses or Management Options     Amount and/or Complexity of Data Reviewed  Clinical lab tests: ordered    Patient Progress  Patient progress: improved      Final diagnoses:   Exposure to confirmed case of COVID-19       ED Disposition  ED Disposition     ED Disposition Condition Comment    Bridget Bray, APRN  [address] [place] [id number]  [phone]    Schedule an appointment as soon as possible for a visit in 1 day           Medication List      No changes were made to your prescriptions during this visit.          Isis Kaur PA  12/03/21 1020       Isis Kaur PA  12/03/21 1021

## 2021-12-03 NOTE — ED NOTES
"Patient walked out of room and left the ED and states \"I am leaving\". Explained risks of leaving AMA and benefits of staying. Patient continued to walk out of ED door. Patient left A&O, ambulatory, and with NADJUNG.        Jessica Haider RN  12/03/21 1027    "

## 2023-01-16 ENCOUNTER — TRANSCRIBE ORDERS (OUTPATIENT)
Dept: ADMINISTRATIVE | Facility: HOSPITAL | Age: 58
End: 2023-01-16
Payer: MEDICARE

## 2023-01-16 DIAGNOSIS — R22.41 MASS OF THIGH, RIGHT: Primary | ICD-10-CM

## 2023-01-23 ENCOUNTER — HOSPITAL ENCOUNTER (OUTPATIENT)
Dept: ULTRASOUND IMAGING | Facility: HOSPITAL | Age: 58
Discharge: HOME | End: 2023-01-23
Admitting: NURSE PRACTITIONER
Payer: MEDICARE

## 2023-01-23 DIAGNOSIS — R22.41 MASS OF THIGH, RIGHT: ICD-10-CM

## 2023-01-23 PROCEDURE — 76882 US LMTD JT/FCL EVL NVASC XTR: CPT

## 2023-01-23 PROCEDURE — 76882 US LMTD JT/FCL EVL NVASC XTR: CPT | Performed by: RADIOLOGY

## 2023-01-24 ENCOUNTER — TRANSCRIBE ORDERS (OUTPATIENT)
Dept: ADMINISTRATIVE | Facility: HOSPITAL | Age: 58
End: 2023-01-24
Payer: MEDICARE

## 2023-01-24 DIAGNOSIS — M79.89 SWELLING OF LIMB: ICD-10-CM

## 2023-01-24 DIAGNOSIS — R22.41 LOCALIZED SWELLING, MASS AND LUMP, LOWER LIMB, RIGHT: Primary | ICD-10-CM

## 2023-01-25 ENCOUNTER — HOSPITAL ENCOUNTER (OUTPATIENT)
Dept: CT IMAGING | Facility: HOSPITAL | Age: 58
Discharge: HOME | End: 2023-01-25
Admitting: NURSE PRACTITIONER
Payer: MEDICARE

## 2023-01-25 DIAGNOSIS — M79.89 SWELLING OF LIMB: ICD-10-CM

## 2023-01-25 DIAGNOSIS — R22.41 LOCALIZED SWELLING, MASS AND LUMP, LOWER LIMB, RIGHT: ICD-10-CM

## 2023-01-25 LAB — CREAT BLDA-MCNC: 1 MG/DL (ref 0.6–1.3)

## 2023-01-25 PROCEDURE — 25010000002 IOPAMIDOL 61 % SOLUTION: Performed by: NURSE PRACTITIONER

## 2023-01-25 PROCEDURE — 73701 CT LOWER EXTREMITY W/DYE: CPT

## 2023-01-25 PROCEDURE — 73701 CT LOWER EXTREMITY W/DYE: CPT | Performed by: RADIOLOGY

## 2023-01-25 PROCEDURE — 82565 ASSAY OF CREATININE: CPT

## 2023-01-25 RX ADMIN — IOPAMIDOL 90 ML: 612 INJECTION, SOLUTION INTRAVENOUS at 09:48

## 2023-08-18 ENCOUNTER — TRANSCRIBE ORDERS (OUTPATIENT)
Dept: ADMINISTRATIVE | Facility: HOSPITAL | Age: 58
End: 2023-08-18
Payer: MEDICARE

## 2023-08-18 DIAGNOSIS — M54.16 LUMBAR RADICULOPATHY: Primary | ICD-10-CM

## 2023-09-05 ENCOUNTER — HOSPITAL ENCOUNTER (OUTPATIENT)
Dept: MRI IMAGING | Facility: HOSPITAL | Age: 58
Discharge: HOME | End: 2023-09-05
Admitting: ANESTHESIOLOGY
Payer: MEDICARE

## 2023-09-05 DIAGNOSIS — M54.16 LUMBAR RADICULOPATHY: ICD-10-CM

## 2023-09-05 PROCEDURE — 72148 MRI LUMBAR SPINE W/O DYE: CPT

## 2023-10-05 ENCOUNTER — TRANSCRIBE ORDERS (OUTPATIENT)
Dept: ADMINISTRATIVE | Facility: HOSPITAL | Age: 58
End: 2023-10-05
Payer: MEDICARE

## 2023-10-05 DIAGNOSIS — R42 DIZZINESS AND GIDDINESS: Primary | ICD-10-CM

## 2023-10-10 ENCOUNTER — HOSPITAL ENCOUNTER (OUTPATIENT)
Dept: CARDIOLOGY | Facility: HOSPITAL | Age: 58
Discharge: HOME | End: 2023-10-10
Admitting: NURSE PRACTITIONER
Payer: MEDICARE

## 2023-10-10 DIAGNOSIS — R42 DIZZINESS AND GIDDINESS: ICD-10-CM

## 2023-10-10 PROCEDURE — 93880 EXTRACRANIAL BILAT STUDY: CPT

## 2024-02-27 ENCOUNTER — HOSPITAL ENCOUNTER (OUTPATIENT)
Dept: RESPIRATORY THERAPY | Facility: HOSPITAL | Age: 59
Discharge: HOME | End: 2024-02-27
Payer: MEDICARE

## 2024-02-27 ENCOUNTER — TRANSCRIBE ORDERS (OUTPATIENT)
Dept: ADMINISTRATIVE | Facility: HOSPITAL | Age: 59
End: 2024-02-27
Payer: MEDICARE

## 2024-02-27 ENCOUNTER — HOSPITAL ENCOUNTER (OUTPATIENT)
Dept: GENERAL RADIOLOGY | Facility: HOSPITAL | Age: 59
Discharge: HOME | End: 2024-02-27
Payer: MEDICARE

## 2024-02-27 DIAGNOSIS — I10 HYPERTENSION, UNSPECIFIED TYPE: Primary | ICD-10-CM

## 2024-02-27 DIAGNOSIS — I10 HYPERTENSION, UNSPECIFIED TYPE: ICD-10-CM

## 2024-02-27 DIAGNOSIS — R06.02 SHORTNESS OF BREATH: ICD-10-CM

## 2024-02-27 LAB
QT INTERVAL: 394 MS
QTC INTERVAL: 403 MS

## 2024-02-27 PROCEDURE — 71046 X-RAY EXAM CHEST 2 VIEWS: CPT | Performed by: RADIOLOGY

## 2024-02-27 PROCEDURE — 93005 ELECTROCARDIOGRAM TRACING: CPT | Performed by: NURSE PRACTITIONER

## 2024-02-27 PROCEDURE — 93010 ELECTROCARDIOGRAM REPORT: CPT | Performed by: SPECIALIST

## 2024-02-27 PROCEDURE — 71046 X-RAY EXAM CHEST 2 VIEWS: CPT

## 2024-08-31 ENCOUNTER — HOSPITAL ENCOUNTER (EMERGENCY)
Facility: HOSPITAL | Age: 59
Discharge: HOME | End: 2024-08-31
Attending: STUDENT IN AN ORGANIZED HEALTH CARE EDUCATION/TRAINING PROGRAM
Payer: MEDICARE

## 2024-08-31 ENCOUNTER — APPOINTMENT (OUTPATIENT)
Dept: GENERAL RADIOLOGY | Facility: HOSPITAL | Age: 59
End: 2024-08-31
Payer: MEDICARE

## 2024-08-31 VITALS
SYSTOLIC BLOOD PRESSURE: 129 MMHG | TEMPERATURE: 97.1 F | HEART RATE: 72 BPM | RESPIRATION RATE: 14 BRPM | BODY MASS INDEX: 28.93 KG/M2 | OXYGEN SATURATION: 100 % | DIASTOLIC BLOOD PRESSURE: 95 MMHG | HEIGHT: 66 IN | WEIGHT: 180 LBS

## 2024-08-31 DIAGNOSIS — R07.9 NONSPECIFIC CHEST PAIN: Primary | ICD-10-CM

## 2024-08-31 LAB
ALBUMIN SERPL-MCNC: 4 G/DL (ref 3.5–5.2)
ALBUMIN/GLOB SERPL: 1.3 G/DL
ALP SERPL-CCNC: 77 U/L (ref 39–117)
ALT SERPL W P-5'-P-CCNC: 12 U/L (ref 1–41)
ANION GAP SERPL CALCULATED.3IONS-SCNC: 14.6 MMOL/L (ref 5–15)
AST SERPL-CCNC: 16 U/L (ref 1–40)
BASOPHILS # BLD AUTO: 0.02 10*3/MM3 (ref 0–0.2)
BASOPHILS NFR BLD AUTO: 0.2 % (ref 0–1.5)
BILIRUB SERPL-MCNC: 0.4 MG/DL (ref 0–1.2)
BUN SERPL-MCNC: 9 MG/DL (ref 6–20)
BUN/CREAT SERPL: 9.9 (ref 7–25)
CALCIUM SPEC-SCNC: 9.2 MG/DL (ref 8.6–10.5)
CHLORIDE SERPL-SCNC: 99 MMOL/L (ref 98–107)
CO2 SERPL-SCNC: 21.4 MMOL/L (ref 22–29)
CREAT SERPL-MCNC: 0.91 MG/DL (ref 0.76–1.27)
DEPRECATED RDW RBC AUTO: 39.3 FL (ref 37–54)
EGFRCR SERPLBLD CKD-EPI 2021: 98.3 ML/MIN/1.73
EOSINOPHIL # BLD AUTO: 0.11 10*3/MM3 (ref 0–0.4)
EOSINOPHIL NFR BLD AUTO: 1.1 % (ref 0.3–6.2)
ERYTHROCYTE [DISTWIDTH] IN BLOOD BY AUTOMATED COUNT: 12.3 % (ref 12.3–15.4)
GEN 5 2HR TROPONIN T REFLEX: 9 NG/L
GLOBULIN UR ELPH-MCNC: 3 GM/DL
GLUCOSE SERPL-MCNC: 337 MG/DL (ref 65–99)
HCT VFR BLD AUTO: 43.6 % (ref 37.5–51)
HGB BLD-MCNC: 14.9 G/DL (ref 13–17.7)
HOLD SPECIMEN: NORMAL
IMM GRANULOCYTES # BLD AUTO: 0.04 10*3/MM3 (ref 0–0.05)
IMM GRANULOCYTES NFR BLD AUTO: 0.4 % (ref 0–0.5)
LYMPHOCYTES # BLD AUTO: 2.8 10*3/MM3 (ref 0.7–3.1)
LYMPHOCYTES NFR BLD AUTO: 26.8 % (ref 19.6–45.3)
MCH RBC QN AUTO: 30.6 PG (ref 26.6–33)
MCHC RBC AUTO-ENTMCNC: 34.2 G/DL (ref 31.5–35.7)
MCV RBC AUTO: 89.5 FL (ref 79–97)
MONOCYTES # BLD AUTO: 0.75 10*3/MM3 (ref 0.1–0.9)
MONOCYTES NFR BLD AUTO: 7.2 % (ref 5–12)
NEUTROPHILS NFR BLD AUTO: 6.74 10*3/MM3 (ref 1.7–7)
NEUTROPHILS NFR BLD AUTO: 64.3 % (ref 42.7–76)
NRBC BLD AUTO-RTO: 0 /100 WBC (ref 0–0.2)
PLATELET # BLD AUTO: 183 10*3/MM3 (ref 140–450)
PMV BLD AUTO: 10.7 FL (ref 6–12)
POTASSIUM SERPL-SCNC: 4.5 MMOL/L (ref 3.5–5.2)
PROT SERPL-MCNC: 7 G/DL (ref 6–8.5)
QT INTERVAL: 296 MS
QTC INTERVAL: 395 MS
RBC # BLD AUTO: 4.87 10*6/MM3 (ref 4.14–5.8)
SODIUM SERPL-SCNC: 135 MMOL/L (ref 136–145)
TROPONIN T DELTA: 0 NG/L
TROPONIN T SERPL HS-MCNC: 9 NG/L
WBC NRBC COR # BLD AUTO: 10.46 10*3/MM3 (ref 3.4–10.8)
WHOLE BLOOD HOLD COAG: NORMAL
WHOLE BLOOD HOLD SPECIMEN: NORMAL

## 2024-08-31 PROCEDURE — 80053 COMPREHEN METABOLIC PANEL: CPT | Performed by: STUDENT IN AN ORGANIZED HEALTH CARE EDUCATION/TRAINING PROGRAM

## 2024-08-31 PROCEDURE — 71045 X-RAY EXAM CHEST 1 VIEW: CPT | Performed by: RADIOLOGY

## 2024-08-31 PROCEDURE — 85025 COMPLETE CBC W/AUTO DIFF WBC: CPT | Performed by: STUDENT IN AN ORGANIZED HEALTH CARE EDUCATION/TRAINING PROGRAM

## 2024-08-31 PROCEDURE — 63710000001 DIPHENHYDRAMINE PER 50 MG: Performed by: NURSE PRACTITIONER

## 2024-08-31 PROCEDURE — 93005 ELECTROCARDIOGRAM TRACING: CPT | Performed by: STUDENT IN AN ORGANIZED HEALTH CARE EDUCATION/TRAINING PROGRAM

## 2024-08-31 PROCEDURE — 84484 ASSAY OF TROPONIN QUANT: CPT | Performed by: STUDENT IN AN ORGANIZED HEALTH CARE EDUCATION/TRAINING PROGRAM

## 2024-08-31 PROCEDURE — 99284 EMERGENCY DEPT VISIT MOD MDM: CPT

## 2024-08-31 PROCEDURE — 36415 COLL VENOUS BLD VENIPUNCTURE: CPT | Performed by: STUDENT IN AN ORGANIZED HEALTH CARE EDUCATION/TRAINING PROGRAM

## 2024-08-31 PROCEDURE — 71045 X-RAY EXAM CHEST 1 VIEW: CPT

## 2024-08-31 RX ORDER — SODIUM CHLORIDE 0.9 % (FLUSH) 0.9 %
10 SYRINGE (ML) INJECTION AS NEEDED
Status: DISCONTINUED | OUTPATIENT
Start: 2024-08-31 | End: 2024-08-31 | Stop reason: HOSPADM

## 2024-08-31 RX ORDER — ASPIRIN 81 MG/1
324 TABLET, CHEWABLE ORAL ONCE
Status: COMPLETED | OUTPATIENT
Start: 2024-08-31 | End: 2024-08-31

## 2024-08-31 RX ORDER — DIPHENHYDRAMINE HCL 25 MG
25 CAPSULE ORAL ONCE
Status: COMPLETED | OUTPATIENT
Start: 2024-08-31 | End: 2024-08-31

## 2024-08-31 RX ADMIN — ASPIRIN 324 MG: 81 TABLET, CHEWABLE ORAL at 12:50

## 2024-08-31 RX ADMIN — DIPHENHYDRAMINE HYDROCHLORIDE 25 MG: 25 CAPSULE ORAL at 17:50

## 2024-08-31 NOTE — ED PROVIDER NOTES
Subjective   History of Present Illness  Patient is a 58-year-old male that presents with chest pain.      Review of Systems    Past Medical History:   Diagnosis Date   • AMI (acute myocardial infarction)     2020   • Anemia    • Carpal tunnel syndrome    • Coronary artery disease    • Elevated cholesterol    • Hyperlipidemia    • Hypertension    • Staph infection     hx arm   • Trigger finger    • Type 2 diabetes mellitus        No Known Allergies    Past Surgical History:   Procedure Laterality Date   • CARPAL TUNNEL RELEASE Right 4/22/2021    Procedure: RIGHT CARPAL TUNNEL RELEASE;  Surgeon: Joesph Queen MD;  Location: [facility];  Service: Orthopedics;  Laterality: Right;   • CIRCUMCISION     • COLONOSCOPY     • SHOULDER SURGERY Right     laceration repair   • TRIGGER FINGER RELEASE Right 4/22/2021    Procedure: TRIGGER FINGER RELEASE RIGHT MIDDLE AND INDEX;  Surgeon: Joesph Queen MD;  Location: [facility];  Service: Orthopedics;  Laterality: Right;   • TRIGGER FINGER RELEASE Left 5/13/2021    Procedure: TRIGGER FINGER RELEASE LEFT MIDDLE AND RING FINGERS;  Surgeon: Joesph Queen MD;  Location: [facility];  Service: Orthopedics;  Laterality: Left;   • UMBILICAL HERNIA REPAIR         Family History   Problem Relation Age of Onset   • Diabetes Mother    • Osteoarthritis Mother    • Osteoporosis Mother    • Heart disease Mother    • Diabetes Father    • Hypertension Father    • Cancer Father    • Diabetes Sister    • Diabetes Brother        Social History     Socioeconomic History   • Marital status:    Tobacco Use   • Smoking status: Every Day     Current packs/day: 1.00     Average packs/day: 1 pack/day for 17.9 years (17.9 ttl pk-yrs)     Types: Cigarettes     Start date: 10/11/2006   • Smokeless tobacco: Never   Vaping Use   • Vaping status: Some Days   • Substances: Nicotine, Flavoring   • Devices: Disposable   Substance and Sexual Activity   • Alcohol use: No   • Drug use:  No   • Sexual activity: Defer           Objective   Physical Exam    Procedures           ED Course                                             Medical Decision Making  Amount and/or Complexity of Data Reviewed  Labs: ordered.  Radiology: ordered.  ECG/medicine tests: ordered.    Risk  OTC drugs.  Prescription drug management.        Final diagnoses:   None       ED Disposition  ED Disposition       None            No follow-up provider specified.       Medication List      No changes were made to your prescriptions during this visit.          informed about risk score for afib and starting anticoagulation, but he declined at this time and I informed him that cardiology could discuss further after a full cardiac evauation     - he is on plavix daily which he was instructed to continue but recommended establishing a follow up with cardiology in the next several days and he should return to the ER if he had acute onset of CP again.    -pt verbalized his understanding and pt was hemodynamically stable at time of discharge with no complaints of CP or SOB. [LK]      ED Course User Index  [LK] Nini Harrison DO            [id number] Score: 2                                  Medical Decision Making  Problems Addressed:  Nonspecific chest pain: complicated acute illness or injury    Amount and/or Complexity of Data Reviewed  Labs: ordered.  Radiology: ordered.  ECG/medicine tests: ordered.    Risk  OTC drugs.  Prescription drug management.        Final diagnoses:   Nonspecific chest pain       ED Disposition  ED Disposition       ED Disposition   Discharge    Condition   Stable    Comment   --               Bridget Perdomo, APRN  [address] [facility] [address]  [facility] [address] [phone]    Schedule an appointment as soon as possible for a visit   For further evaluation    Reggie Morley MD  [address] [facility] [facility] [address]  [facility] [facility] [address]  [phone]    Schedule an appointment as soon as possible for a visit            Medication List      No changes were made to your prescriptions during this visit.            Nini Harrison DO  09/05/24 3560

## 2024-08-31 NOTE — DISCHARGE INSTRUCTIONS
Follow up with your primary care provider in 1-2 days    Return to the emergency room for worsening symptoms.

## (undated) DEVICE — GLV SURG PREMIERPRO MIC LTX PF SZ8 BRN

## (undated) DEVICE — HOLDER: Brand: DEROYAL

## (undated) DEVICE — BNDG ELAS COFLEX LF2 SLF ADHR 2IN 5YD LF TN

## (undated) DEVICE — APPL CHLORAPREP HI/LITE 26ML ORNG

## (undated) DEVICE — PK EXTREM UPPR 70

## (undated) DEVICE — DRSNG WND GZ CURAD OIL EMULSION 3X3IN STRL

## (undated) DEVICE — SUT ETHLN 3/0 FS1 663G

## (undated) DEVICE — PATIENT RETURN ELECTRODE, SINGLE-USE, CONTACT QUALITY MONITORING, ADULT, WITH 9FT CORD, FOR PATIENTS WEIGING OVER 33LBS. (15KG): Brand: MEGADYNE

## (undated) DEVICE — DISPOSABLE TOURNIQUET CUFF SINGLE BLADDER, SINGLE PORT AND LUER LOCK CONNECTOR: Brand: COLOR CUFF

## (undated) DEVICE — BNDG ELAS MATRX V/CLS 2INX5YD LF

## (undated) DEVICE — AMD ANTIMICROBIAL GAUZE SPONGES,12 PLY USP TYPE VII, 0.2% POLYHEXAMETHYLENE BIGUANIDE HCI (PHMB): Brand: CURITY